# Patient Record
Sex: MALE | Race: BLACK OR AFRICAN AMERICAN | URBAN - METROPOLITAN AREA
[De-identification: names, ages, dates, MRNs, and addresses within clinical notes are randomized per-mention and may not be internally consistent; named-entity substitution may affect disease eponyms.]

---

## 2021-04-20 ENCOUNTER — TELEPHONE (OUTPATIENT)
Dept: INTERNAL MEDICINE CLINIC | Age: 51
End: 2021-04-20

## 2021-05-05 ENCOUNTER — TELEPHONE (OUTPATIENT)
Dept: INTERNAL MEDICINE CLINIC | Age: 51
End: 2021-05-05

## 2021-05-05 ENCOUNTER — OFFICE VISIT (OUTPATIENT)
Dept: INTERNAL MEDICINE CLINIC | Age: 51
End: 2021-05-05
Payer: COMMERCIAL

## 2021-05-05 VITALS
DIASTOLIC BLOOD PRESSURE: 92 MMHG | WEIGHT: 225 LBS | HEIGHT: 67 IN | TEMPERATURE: 98.9 F | SYSTOLIC BLOOD PRESSURE: 138 MMHG | BODY MASS INDEX: 35.31 KG/M2 | HEART RATE: 100 BPM | RESPIRATION RATE: 16 BRPM

## 2021-05-05 DIAGNOSIS — E78.5 HYPERLIPIDEMIA, UNSPECIFIED HYPERLIPIDEMIA TYPE: ICD-10-CM

## 2021-05-05 DIAGNOSIS — I10 ESSENTIAL HYPERTENSION: Primary | ICD-10-CM

## 2021-05-05 DIAGNOSIS — K59.00 CONSTIPATION, UNSPECIFIED CONSTIPATION TYPE: ICD-10-CM

## 2021-05-05 DIAGNOSIS — F17.200 TOBACCO USE DISORDER: ICD-10-CM

## 2021-05-05 DIAGNOSIS — E11.8 CONTROLLED TYPE 2 DIABETES MELLITUS WITH COMPLICATION, WITHOUT LONG-TERM CURRENT USE OF INSULIN (HCC): ICD-10-CM

## 2021-05-05 DIAGNOSIS — Z12.11 SCREENING FOR COLON CANCER: ICD-10-CM

## 2021-05-05 DIAGNOSIS — Z11.59 SCREENING FOR VIRAL DISEASE: ICD-10-CM

## 2021-05-05 PROCEDURE — 99204 OFFICE O/P NEW MOD 45 MIN: CPT | Performed by: INTERNAL MEDICINE

## 2021-05-05 RX ORDER — DOCUSATE SODIUM 100 MG/1
100 CAPSULE, LIQUID FILLED ORAL 2 TIMES DAILY PRN
COMMUNITY
Start: 2021-05-05 | End: 2021-05-05 | Stop reason: SDUPTHER

## 2021-05-05 RX ORDER — DOCUSATE SODIUM 100 MG/1
100 CAPSULE, LIQUID FILLED ORAL 2 TIMES DAILY PRN
Qty: 60 CAPSULE | Refills: 2 | Status: SHIPPED | OUTPATIENT
Start: 2021-05-05 | End: 2022-09-14

## 2021-05-05 RX ORDER — LISINOPRIL AND HYDROCHLOROTHIAZIDE 12.5; 1 MG/1; MG/1
1 TABLET ORAL DAILY
Qty: 30 TABLET | Refills: 3 | Status: SHIPPED | OUTPATIENT
Start: 2021-05-05 | End: 2021-05-06 | Stop reason: SDUPTHER

## 2021-05-05 RX ORDER — CHLORTHALIDONE 25 MG/1
25 TABLET ORAL EVERY MORNING
Qty: 30 TABLET | Refills: 3 | Status: SHIPPED | OUTPATIENT
Start: 2021-05-05 | End: 2021-05-05

## 2021-05-05 SDOH — ECONOMIC STABILITY: TRANSPORTATION INSECURITY
IN THE PAST 12 MONTHS, HAS LACK OF TRANSPORTATION KEPT YOU FROM MEETINGS, WORK, OR FROM GETTING THINGS NEEDED FOR DAILY LIVING?: NOT ASKED

## 2021-05-05 SDOH — ECONOMIC STABILITY: FOOD INSECURITY: WITHIN THE PAST 12 MONTHS, YOU WORRIED THAT YOUR FOOD WOULD RUN OUT BEFORE YOU GOT MONEY TO BUY MORE.: NOT ASKED

## 2021-05-05 SDOH — ECONOMIC STABILITY: FOOD INSECURITY: WITHIN THE PAST 12 MONTHS, THE FOOD YOU BOUGHT JUST DIDN'T LAST AND YOU DIDN'T HAVE MONEY TO GET MORE.: NOT ASKED

## 2021-05-05 SDOH — HEALTH STABILITY: MENTAL HEALTH: HOW MANY STANDARD DRINKS CONTAINING ALCOHOL DO YOU HAVE ON A TYPICAL DAY?: 1 OR 2

## 2021-05-05 ASSESSMENT — ENCOUNTER SYMPTOMS
SHORTNESS OF BREATH: 0
CONSTIPATION: 1
ABDOMINAL PAIN: 0
DIARRHEA: 0
COUGH: 0
RHINORRHEA: 0
TROUBLE SWALLOWING: 0
SORE THROAT: 0
NAUSEA: 0

## 2021-05-05 ASSESSMENT — PATIENT HEALTH QUESTIONNAIRE - PHQ9
SUM OF ALL RESPONSES TO PHQ QUESTIONS 1-9: 0
SUM OF ALL RESPONSES TO PHQ QUESTIONS 1-9: 0
2. FEELING DOWN, DEPRESSED OR HOPELESS: 0

## 2021-05-05 NOTE — TELEPHONE ENCOUNTER
Patient wife called in stating the patient did not have his Form filled out at his appointment and wants to know why not. I spoke with Court Poster who states they did not get the form until the patient was checking out from his appointment. Patient wife Suzan Boswell would like a call back from the doctor only to discuss.    Please call her or Angel @ [phone]  Thank you

## 2021-05-05 NOTE — PROGRESS NOTES
Angel Doe (:  1970) is a 54 y.o. male, here for evaluation of the following chief complaint(s):    Established New Doctor (Constipation. Undecided about Covid vaccine.)      ASSESSMENT/PLAN:  1. Essential hypertension  Blood pressure is elevated today and chart review shows it has been elevated in the past.  Advised patient to start lisinopril-hydrochlorothiazide and follow-up in 1 month. Warned him of potential side effects.  -     Lipid Panel; Future  -     Comprehensive Metabolic Panel, Fasting; Future  2. Hyperlipidemia, unspecified hyperlipidemia type  Chart review shows elevated lipids. Will recheck and likely start statin. 3. Controlled type 2 diabetes mellitus with complication, without long-term current use of insulin ([facility] [address]  Chart review shows an A1c of 6.6. Will recheck and likely start medication.  -     Hemoglobin A1C; Future  4. Screening for viral disease  -     HEPATITIS C ANTIBODY; Future  -     HIV-1 AND HIV-2 ANTIBODIES; Future  5. Screening for colon cancer  -     [facility] - Ramirez Alcala MD, Gastroenterology, [place]  6. Constipation, unspecified constipation type  Discussed he could use daily Colace and increase fluids  7. Tobacco use disorder  Counseled to quit  8. HM -advised patient to get Covid vaccine. He states he will be needing paperwork filled out for a foster care application. Told him that he may need to get Tdap for that. Return in about 4 weeks (around 2021). SUBJECTIVE/OBJECTIVE:  HPI   Patient is here to get established. Reviewed chart for past medical history as patient is a fair historian. He states that he has a foster care application that will need to be completed. States he does not have it with him today. Overall he feels well except for constipation. This is becoming more persistent problem. Denies blood in stool. He denies abdominal pain. Review of Systems   Constitutional: Negative for fatigue and fever.    HENT: Negative for rhinorrhea, sore throat and trouble swallowing. Eyes: Negative for visual disturbance. Respiratory: Negative for cough and shortness of breath. Cardiovascular: Negative for chest pain and palpitations. Gastrointestinal: Positive for constipation. Negative for abdominal pain, diarrhea and nausea. Genitourinary: Negative for decreased urine volume, dysuria and frequency. Musculoskeletal: Negative for arthralgias and myalgias. Skin: Negative for rash. Allergic/Immunologic: Negative for immunocompromised state. Neurological: Negative for dizziness, numbness and headaches. Hematological: Does not bruise/bleed easily. Psychiatric/Behavioral: Negative for dysphoric mood and sleep disturbance. The patient is not nervous/anxious. Past Medical History:   Diagnosis Date    Closed fracture of right tibia and fibula     as child    Diabetes mellitus type 2, uncomplicated ([address]     Hypertension     Other hyperlipidemia     Tobacco use disorder        Current Outpatient Medications   Medication Sig Dispense Refill    lisinopril-hydroCHLOROthiazide (PRINZIDE;ZESTORETIC) 10-12.5 MG per tablet Take 1 tablet by mouth daily 30 tablet 3    docusate sodium (COLACE) 100 MG capsule Take 1 capsule by mouth 2 times daily as needed for Constipation 60 capsule 2     No current facility-administered medications for this visit. Physical Exam  Vitals signs and nursing note reviewed. Constitutional:       General: He is not in acute distress. Appearance: He is well-developed. HENT:      Head: Normocephalic and atraumatic. Right Ear: External ear normal.      Left Ear: External ear normal.   Eyes:      General: No scleral icterus. Extraocular Movements: Extraocular movements intact. Neck:      Musculoskeletal: Normal range of motion. Thyroid: No thyromegaly. Cardiovascular:      Rate and Rhythm: Normal rate and regular rhythm. Heart sounds: No murmur. Pulmonary:      Effort: No respiratory distress. Breath sounds: Normal breath sounds. No wheezing or rales. Abdominal:      General: Bowel sounds are normal. There is no distension. Palpations: Abdomen is soft. Tenderness: There is no abdominal tenderness. Musculoskeletal: Normal range of motion. General: No deformity. Lymphadenopathy:      Cervical: No cervical adenopathy. Skin:     General: Skin is warm and dry. Neurological:      Mental Status: He is alert and oriented to person, place, and time. Cranial Nerves: No cranial nerve deficit. Sensory: No sensory deficit. Coordination: Coordination normal.   Psychiatric:         Thought Content: Thought content normal.         On this date I have spent 45 minutes reviewing previous notes, test results and face to face with the patient discussing the diagnosis and importance of compliance with the treatment plan as well as documenting on the day of the visit. This note was generated completely or in part utilizing Dragon dictation speech recognition software. Occasionally, words are mistranscribed and despite editing, the text may contain inaccuracies due to incorrect word recognition. If further clarification is needed please contact the office at [phone]          An electronic signature was used to authenticate this note.     --Phill Gale MD

## 2021-05-05 NOTE — TELEPHONE ENCOUNTER
Spoke to patient's wife. Told her he did not have the form to provide to me during the appointment. Told her that once I received it, I completed the form. He can come and pick it up but he needs to sign second page which is a release form. He needs to attach a copy of his immunization records as noted on the first page. He can either get a Tdap shot here or at a local pharmacy. Set up a nurse visit if he wants to get Tdap done here.

## 2021-05-06 ENCOUNTER — NURSE ONLY (OUTPATIENT)
Dept: INTERNAL MEDICINE CLINIC | Age: 51
End: 2021-05-06
Payer: COMMERCIAL

## 2021-05-06 ENCOUNTER — TELEPHONE (OUTPATIENT)
Dept: INTERNAL MEDICINE CLINIC | Age: 51
End: 2021-05-06

## 2021-05-06 DIAGNOSIS — Z23 NEED FOR TDAP VACCINATION: Primary | ICD-10-CM

## 2021-05-06 PROCEDURE — 90471 IMMUNIZATION ADMIN: CPT | Performed by: INTERNAL MEDICINE

## 2021-05-06 PROCEDURE — 90715 TDAP VACCINE 7 YRS/> IM: CPT | Performed by: INTERNAL MEDICINE

## 2021-05-06 RX ORDER — LISINOPRIL AND HYDROCHLOROTHIAZIDE 12.5; 1 MG/1; MG/1
1 TABLET ORAL DAILY
Qty: 30 TABLET | Refills: 3 | Status: SHIPPED | OUTPATIENT
Start: 2021-05-06 | End: 2021-09-22

## 2021-05-06 NOTE — TELEPHONE ENCOUNTER
Patient is requesting yesterday's medications to be resent to [facility] [address], [address]

## 2021-05-17 ENCOUNTER — TELEPHONE (OUTPATIENT)
Dept: INTERNAL MEDICINE CLINIC | Age: 51
End: 2021-05-17

## 2021-05-17 NOTE — TELEPHONE ENCOUNTER
----- Message from Wendie Wilkinson sent at 5/17/2021 12:35 PM EDT -----  Subject: Message to Provider    QUESTIONS  Information for Provider? PT would like nurse to call back  ---------------------------------------------------------------------------  --------------  [address]  What is the best way for the office to contact you? OK to leave message on   voicemail  Preferred Call Back Phone Number? [phone]  ---------------------------------------------------------------------------  --------------  SCRIPT ANSWERS  Relationship to Patient?  Self

## 2021-05-17 NOTE — TELEPHONE ENCOUNTER
Patient wanted to redo DM test because he was not fasting. Explained that the A1C is a 3 month average and does not need to be fasting. Patient said he was waiting to start his Metformin until it redone. Again explained the test that was done does not need to be fasting. Advised he is start the medication and follow up 2 weeks after that.  They will go pick medication up today

## 2021-09-15 LAB — LEFT VENTRICULAR EJECTION FRACTION, EXTERNAL: 43

## 2021-09-21 ENCOUNTER — TELEPHONE (OUTPATIENT)
Dept: INTERNAL MEDICINE CLINIC | Age: 51
End: 2021-09-21

## 2021-09-22 ENCOUNTER — OFFICE VISIT (OUTPATIENT)
Dept: INTERNAL MEDICINE CLINIC | Age: 51
End: 2021-09-22
Payer: COMMERCIAL

## 2021-09-22 VITALS
TEMPERATURE: 98 F | SYSTOLIC BLOOD PRESSURE: 136 MMHG | DIASTOLIC BLOOD PRESSURE: 90 MMHG | HEART RATE: 80 BPM | OXYGEN SATURATION: 96 % | WEIGHT: 213 LBS | RESPIRATION RATE: 16 BRPM | BODY MASS INDEX: 33.36 KG/M2

## 2021-09-22 DIAGNOSIS — F17.200 TOBACCO USE DISORDER: ICD-10-CM

## 2021-09-22 DIAGNOSIS — Z12.11 SCREENING FOR COLON CANCER: ICD-10-CM

## 2021-09-22 DIAGNOSIS — I63.9 CEREBROVASCULAR ACCIDENT (CVA), UNSPECIFIED MECHANISM (HCC): Primary | ICD-10-CM

## 2021-09-22 DIAGNOSIS — I50.20 SYSTOLIC CONGESTIVE HEART FAILURE, UNSPECIFIED HF CHRONICITY (HCC): ICD-10-CM

## 2021-09-22 DIAGNOSIS — E78.5 HYPERLIPIDEMIA, UNSPECIFIED HYPERLIPIDEMIA TYPE: ICD-10-CM

## 2021-09-22 DIAGNOSIS — I10 ESSENTIAL HYPERTENSION: ICD-10-CM

## 2021-09-22 LAB
CREATININE URINE: 225.3 MG/DL (ref 39–259)
HBA1C MFR BLD: 10.4 %
MICROALBUMIN UR-MCNC: 4.5 MG/DL
MICROALBUMIN/CREAT UR-RTO: 20 MG/G (ref 0–30)

## 2021-09-22 PROCEDURE — 83036 HEMOGLOBIN GLYCOSYLATED A1C: CPT | Performed by: INTERNAL MEDICINE

## 2021-09-22 PROCEDURE — 90472 IMMUNIZATION ADMIN EACH ADD: CPT | Performed by: INTERNAL MEDICINE

## 2021-09-22 PROCEDURE — 99215 OFFICE O/P EST HI 40 MIN: CPT | Performed by: INTERNAL MEDICINE

## 2021-09-22 PROCEDURE — 90732 PPSV23 VACC 2 YRS+ SUBQ/IM: CPT | Performed by: INTERNAL MEDICINE

## 2021-09-22 PROCEDURE — 90471 IMMUNIZATION ADMIN: CPT | Performed by: INTERNAL MEDICINE

## 2021-09-22 PROCEDURE — 90674 CCIIV4 VAC NO PRSV 0.5 ML IM: CPT | Performed by: INTERNAL MEDICINE

## 2021-09-22 RX ORDER — ATORVASTATIN CALCIUM 80 MG/1
80 TABLET, FILM COATED ORAL DAILY
Qty: 90 TABLET | Refills: 1 | Status: SHIPPED | OUTPATIENT
Start: 2021-09-22 | End: 2022-08-22 | Stop reason: SDUPTHER

## 2021-09-22 RX ORDER — CLOPIDOGREL BISULFATE 75 MG/1
75 TABLET ORAL DAILY
Qty: 30 TABLET | Refills: 0 | Status: SHIPPED | OUTPATIENT
Start: 2021-09-22 | End: 2021-11-19

## 2021-09-22 RX ORDER — ASPIRIN 81 MG/1
81 TABLET ORAL DAILY
Qty: 30 TABLET | Refills: 3 | COMMUNITY
Start: 2021-09-22

## 2021-09-22 RX ORDER — LISINOPRIL AND HYDROCHLOROTHIAZIDE 20; 12.5 MG/1; MG/1
1 TABLET ORAL DAILY
Qty: 90 TABLET | Refills: 3 | Status: SHIPPED | OUTPATIENT
Start: 2021-09-22 | End: 2021-11-01 | Stop reason: ALTCHOICE

## 2021-09-22 RX ORDER — METFORMIN HYDROCHLORIDE 500 MG/1
2000 TABLET, EXTENDED RELEASE ORAL
Qty: 120 TABLET | Refills: 1 | Status: SHIPPED | OUTPATIENT
Start: 2021-09-22 | End: 2021-11-19

## 2021-09-22 NOTE — LETTER
[facility]) Physicians [address] [address]  Phone: [phone]  Fax: [phone]    Jolene Tenorio MD        September 22, 2021     Patient: Ramila Bridges   YOB: 1970   Date of Visit: 9/22/2021       To Whom It May Concern: It is my medical opinion that Daly Cates should remain out of work until October 11. If you have any questions or concerns, please don't hesitate to call.     Sincerely,        Jolene Tenorio MD

## 2021-09-22 NOTE — PROGRESS NOTES
Angel Doe (:  1970) is a 54 y.o. male, here for evaluation of the following chief complaint(s): Other (Recent  stroke one week ago. Evaluated at [facility].)      ASSESSMENT/PLAN:  1. Cerebrovascular accident (CVA), unspecified mechanism [facility])  Reviewed his [facility] records with him. According to his wife he was discharged early from the hospital because he needed to attend the  of his brother and so certain things were not arranged for his follow-up care and we took care of these things today. Sent the medications to his pharmacy that he has not yet started. Arranged physical therapy. I believe patient has follow-up with [facility] neurology as per discharge summary. Reviewed brain imaging.  -     Gigi Pena MD, Cardiology, [facility]  -     aspirin EC 81 MG EC tablet; Take 1 tablet by mouth daily, Disp-30 tablet, R-3OTC  -     clopidogrel (PLAVIX) 75 MG tablet; Take 1 tablet by mouth daily, Disp-30 tablet, R-0Normal  -     [facility] - [place]  2. Systolic congestive heart failure, unspecified HF chronicity (HCC)  Echocardiogram with reduced ejection fraction. Patient was also supposed to have heart monitor arranged but states it was not done. Refer to cardiology. -     Gigi Pena MD, Cardiology, [facility]  3. Diabetes mellitus type 2 with complications, uncontrolled ([address]  Poorly controlled. Patient states he has loose stools with his current Metformin. Advised him to change to extended release and will increase the dose. May add another medication in 2 weeks. -     Microalbumin / Creatinine Urine Ratio  -     POCT glycosylated hemoglobin (Hb A1C)  -     metFORMIN (GLUCOPHAGE-XR) 500 MG extended release tablet; Take 4 tablets by mouth daily (with breakfast), Disp-120 tablet, R-1Normal  4. Essential hypertension  Mildly elevated.   Increase dose of blood pressure medication as follows. Call if lip swelling recurs. -     lisinopril-hydroCHLOROthiazide (PRINZIDE;ZESTORETIC) 20-12.5 MG per tablet; Take 1 tablet by mouth daily, Disp-90 tablet, R-3Normal  5. Hyperlipidemia, unspecified hyperlipidemia type  Sent his statin to the pharmacy. He has not yet started it  -     atorvastatin (LIPITOR) 80 MG tablet; Take 1 tablet by mouth daily, Disp-90 tablet, R-1Normal  6. Screening for colon cancer  -     [facility] - Selena Varghese MD, Gastroenterology, [place]  7. Tobacco use disorder  He has cut back on his own. -     nicotine (NICODERM CQ) 7 MG/24HR; Place 1 patch onto the skin daily for 14 days, Disp-14 patch, R-0Normal      Return in about 2 weeks (around 10/6/2021). SUBJECTIVE/OBJECTIVE:  HPI   Patient is here to follow-up after stroke. He was seen at [facility] earlier this week. According to his wife, who was in the car with young children, but on video with us, he was discharged early so many things were not arranged. His brother had passed away and the patient needed to go to the .    The patient would like to return to work. He does physical labor. He continues to have weakness on the left arm. He has not had physical therapy arranged. Brain MRI  1.  No evidence of flow-limiting stenosis. Head   1.  No large vessel occlusion.    2.  Moderate focal narrowing in the right posterior M2 origin. Additional multifocal areas of irregularity and narrowing in the basilar, posterior cerebral and middle cerebral arteries, likely representing diffuse atherosclerotic disease.    - Left ventricle: The cavity size is normal. Wall thickness is normal. Systolic function was mildly     to moderately reduced. The estimated ejection fraction was in the range of 40% to 45%. Mild     diffuse hypokinesis with regional variations. Doppler parameters are consistent with abnormal left     ventricular relaxation (grade 1 diastolic dysfunction).    - Aortic valve: Mild regurgitation.   - Right ventricle: Systolic function was normal.   - Atrial septum: Agitated saline contrast study at baseline or with provocation, shows no     right-to-left atrial level shunt. Metformin makes him have loose stools. Patient reports many people in her family have angioedema from lisinopril and so she would prefer he take a different medication. We discussed the echocardiogram with the low ejection fraction and the importance of taking an ACE or ARB. Patient has not been having lip swelling except he says for one episode. His residual deficits from the stroke include fatigue and and left arm weakness? ?.  He denies facial numbness. He denies trouble swallowing. He has an old problem with the right eye deviating out. He states left-sided facial droop has resolved. I did not evaluate his face because he has not had a Covid vaccine.         Past Medical History:   Diagnosis Date    Closed fracture of right tibia and fibula     as child    Diabetes mellitus type 2, uncomplicated ([facility] [address]     Hypertension     Other hyperlipidemia     Stroke ([facility] 09/2021        Tobacco use disorder        Current Outpatient Medications   Medication Sig Dispense Refill    metFORMIN (GLUCOPHAGE-XR) 500 MG extended release tablet Take 4 tablets by mouth daily (with breakfast) 120 tablet 1    aspirin EC 81 MG EC tablet Take 1 tablet by mouth daily 30 tablet 3    clopidogrel (PLAVIX) 75 MG tablet Take 1 tablet by mouth daily 30 tablet 0    atorvastatin (LIPITOR) 80 MG tablet Take 1 tablet by mouth daily 90 tablet 1    lisinopril-hydroCHLOROthiazide (PRINZIDE;ZESTORETIC) 20-12.5 MG per tablet Take 1 tablet by mouth daily 90 tablet 3    nicotine (NICODERM CQ) 7 MG/24HR Place 1 patch onto the skin daily for 14 days 14 patch 0    docusate sodium (COLACE) 100 MG capsule Take 1 capsule by mouth 2 times daily as needed for Constipation 60 capsule 2     No current facility-administered medications for this visit.       Physical Exam  Vitals and nursing note reviewed. Constitutional:       General: He is not in acute distress. Appearance: He is well-developed. HENT:      Head: Normocephalic and atraumatic. Right Ear: External ear normal.      Left Ear: External ear normal.      Nose: Nose normal.   Eyes:      General: No scleral icterus. Neck:      Thyroid: No thyromegaly. Cardiovascular:      Rate and Rhythm: Normal rate and regular rhythm. Heart sounds: No murmur heard. Pulmonary:      Effort: No respiratory distress. Breath sounds: Normal breath sounds. No wheezing or rales. Abdominal:      General: Bowel sounds are normal. There is no distension. Palpations: Abdomen is soft. Tenderness: There is no abdominal tenderness. Musculoskeletal:         General: No deformity. Normal range of motion. Cervical back: Normal range of motion. Right lower leg: No edema. Left lower leg: No edema. Lymphadenopathy:      Cervical: No cervical adenopathy. Skin:     General: Skin is warm and dry. Neurological:      Mental Status: He is alert and oriented to person, place, and time. Sensory: No sensory deficit. Motor: No weakness. Coordination: Coordination normal.      Comments: Cn's not checked   Psychiatric:         Thought Content: Thought content normal.           On this date 9/22/2021 I have spent 45 minutes reviewing previous notes, test results and face to face with the patient discussing the diagnosis and importance of compliance with the treatment plan as well as documenting on the day of the visit. This note was generated completely or in part utilizing Dragon dictation speech recognition software. Occasionally, words are mistranscribed and despite editing, the text may contain inaccuracies due to incorrect word recognition.   If further clarification is needed please contact the office at [phone]          An electronic signature was

## 2021-09-22 NOTE — PATIENT INSTRUCTIONS
Change from 2x/day metformin to XR Metformin-4/day  May add another drug next visit for diabetes    Increase lisinopril to 20/12.5    Urine test today    Call about retinal exam    Flu and pneumovax shot  covid vaccine at pharmacy    Gi/colon  [phone]    Sent meds to [facility]era   Physical therapy consult    Cardiology -discuss echo finding with low ejection fraction and also need for heart monitor

## 2021-10-05 ENCOUNTER — TELEPHONE (OUTPATIENT)
Dept: INTERNAL MEDICINE CLINIC | Age: 51
End: 2021-10-05

## 2021-10-05 NOTE — TELEPHONE ENCOUNTER
He is Aware. You can call to remind him. --  Forwarding this also to Simone Queen for message below.

## 2021-10-11 ENCOUNTER — TELEPHONE (OUTPATIENT)
Dept: INTERNAL MEDICINE CLINIC | Age: 51
End: 2021-10-11

## 2021-10-11 NOTE — TELEPHONE ENCOUNTER
Patient came for follow-up appointment but states he was exposed to Covid a few days ago and he is experiencing chills today. Video visit is not appropriate for the purpose of today's appt to recheck bp etc.    Told him we would set him up for a Covid test this Wednesday. Told him I would write a work note release 1 more week until we settle the Covid testing issue. Please assist in speaking to his job, letting him know his followup visit is now pushed out 2 more weeks. Told wife I would work on his [employer] disability form today/tomorrow.   --  Forwarding to Social Work and scheduling staff

## 2021-10-11 NOTE — LETTER
[facility]) Physicians [address]  [address] [place] [id number]  Phone: [phone]  Fax: [phone]    Hossein Feldman MD        October 11, 2021     Patient: Rafael Dave   YOB: 1970   Date of Visit: 10/11/2021       To Whom It May Concern: It is my medical opinion that Janicee Hazard should remain out of work until at least October 18, 2021 due to Covid exposure with symptoms. Will reassess after testing. .    If you have any questions or concerns, please don't hesitate to call.     Sincerely,        Hossein Feldman MD

## 2021-10-13 ENCOUNTER — NURSE ONLY (OUTPATIENT)
Dept: INTERNAL MEDICINE CLINIC | Age: 51
End: 2021-10-13

## 2021-10-13 ENCOUNTER — TELEPHONE (OUTPATIENT)
Dept: INTERNAL MEDICINE CLINIC | Age: 51
End: 2021-10-13

## 2021-10-13 DIAGNOSIS — Z11.59 ENCOUNTER FOR SCREENING FOR VIRAL DISEASE: ICD-10-CM

## 2021-10-13 NOTE — TELEPHONE ENCOUNTER
Patients wife is calling about the short term disability paperwork that Dr Josesito Brito was working on stating that this is supposed to be turned in by tomorrow 10-. She states she spoke with his employer and they need this in order for the patient to get paid for his time off.

## 2021-10-13 NOTE — TELEPHONE ENCOUNTER
He has not been tested yet, he has an appt today at 1pm to be covid tested.  He states cant make any appts until he is tested,

## 2021-10-13 NOTE — TELEPHONE ENCOUNTER
Tried to call pt, phone was breaking up bad.  Called back no answer so I left a vm asking that he calls the office back

## 2021-10-13 NOTE — TELEPHONE ENCOUNTER
Tell patient I referred him to PT and to Cardiology at our last visit and I don't see that he has appointments with them. He needs to make those appointments. Is his Covid test negative or positive? Let him know I have his paperwork done for his job.

## 2021-10-14 ENCOUNTER — TELEPHONE (OUTPATIENT)
Dept: PRIMARY CARE CLINIC | Age: 51
End: 2021-10-14

## 2021-10-14 LAB — SARS-COV-2: DETECTED

## 2021-10-21 ENCOUNTER — TELEPHONE (OUTPATIENT)
Dept: INTERNAL MEDICINE CLINIC | Age: 51
End: 2021-10-21

## 2021-10-21 DIAGNOSIS — Z11.59 SCREENING FOR VIRAL DISEASE: Primary | ICD-10-CM

## 2021-10-21 NOTE — TELEPHONE ENCOUNTER
Please place order for covid test. No symptoms at this time was advised to be retested by dr Josesito Brito

## 2021-10-25 ENCOUNTER — NURSE ONLY (OUTPATIENT)
Dept: INTERNAL MEDICINE CLINIC | Age: 51
End: 2021-10-25

## 2021-10-25 DIAGNOSIS — Z11.59 SCREENING FOR VIRAL DISEASE: ICD-10-CM

## 2021-10-26 LAB — SARS-COV-2, PCR: DETECTED

## 2021-11-01 ENCOUNTER — OFFICE VISIT (OUTPATIENT)
Dept: INTERNAL MEDICINE CLINIC | Age: 51
End: 2021-11-01
Payer: COMMERCIAL

## 2021-11-01 VITALS
DIASTOLIC BLOOD PRESSURE: 84 MMHG | WEIGHT: 213 LBS | SYSTOLIC BLOOD PRESSURE: 140 MMHG | OXYGEN SATURATION: 98 % | HEIGHT: 67 IN | HEART RATE: 87 BPM | TEMPERATURE: 98 F | BODY MASS INDEX: 33.43 KG/M2

## 2021-11-01 DIAGNOSIS — I63.9 CEREBROVASCULAR ACCIDENT (CVA), UNSPECIFIED MECHANISM (HCC): ICD-10-CM

## 2021-11-01 DIAGNOSIS — E78.5 HYPERLIPIDEMIA, UNSPECIFIED HYPERLIPIDEMIA TYPE: ICD-10-CM

## 2021-11-01 DIAGNOSIS — Z11.59 SCREENING FOR VIRAL DISEASE: ICD-10-CM

## 2021-11-01 DIAGNOSIS — I10 ESSENTIAL HYPERTENSION: ICD-10-CM

## 2021-11-01 DIAGNOSIS — U07.1 COVID-19: Primary | ICD-10-CM

## 2021-11-01 PROCEDURE — 99214 OFFICE O/P EST MOD 30 MIN: CPT | Performed by: INTERNAL MEDICINE

## 2021-11-01 RX ORDER — LISINOPRIL AND HYDROCHLOROTHIAZIDE 25; 20 MG/1; MG/1
1 TABLET ORAL DAILY
Qty: 90 TABLET | Refills: 1 | Status: SHIPPED | OUTPATIENT
Start: 2021-11-01 | End: 2022-04-29

## 2021-11-01 NOTE — PROGRESS NOTES
Angel Doe (:  1970) is a 54 y.o. male, here for evaluation of the following chief complaint(s): Other (CVA f/u )      ASSESSMENT/PLAN:  1. COVID-19  Other than residual fatigue and mild loss of taste, patient is recovering well from Covid. 2. Diabetes mellitus type 2 with complications, uncontrolled ([address]  Check labs on Metformin. Patient reports compliance. Loose stools are doing better on extended release. -     Hemoglobin A1C; Future  -     Comprehensive Metabolic Panel, Fasting; Future  -     Lipid Panel; Future  3. Cerebrovascular accident (CVA), unspecified mechanism ([address]  Patient's left upper arm has 5 out of 5 strength. He states there is residual stiffness. Advised physical therapy again. Also advised to have ED follow-up appointment at [facility] neurology to determine length of therapy with Plavix. Continue aspirin. He continues to work on quitting smoking and is wearing a 14 mg nicotine patch.  -     CBC; Future  4. Essential hypertension  Mildly elevated on current medications. Change to lisinopril HCTZ .  -     URIC ACID; Future  5. Hyperlipidemia, unspecified hyperlipidemia type  Patient states he had pruritus on first days of taking Lipitor but it is controlled. Check labs. 6. Screening for viral disease  -     Hepatitis B Surface Antibody; Future  7. Systolic CHF -again advised to see Dr. Jessica Storey  Echocardiogram with reduced ejection fraction. Patient was also supposed to have heart monitor arranged but states it was not done at [facility].      8. Tobacco use disorder  He is trying to quit    Return in about 3 months (around 2022). SUBJECTIVE/OBJECTIVE:  HPI   Patient is here to follow-up. He has had a hard time seeing specialists regarding his stroke in September, for which he was seen at [facility]. This is due to himself and family members having Covid in recent weeks and being in quarantine.   I spoke to his wife and she plans to make sure he gets to visits in the next 2 weeks. His strength is improving but he does still have some residual left arm stiffness. He has not yet seen physical therapy. He reports compliance with medication. His wife prepares a pillbox for him. He has no new complaints except for residual fatigue and mild loss of taste from Covid. He denies chest pain or shortness of breath. He briefly felt itchy when he started the statin but this has resolved. Past Medical History:   Diagnosis Date    Closed fracture of right tibia and fibula     as child    COVID-19     [facility] mellitus type 2, uncomplicated ([facility]     Hypertension     Other hyperlipidemia     Stroke ([facility] 33/8812    uc    Systolic CHF ([facility]     echo 9/21    Tobacco use disorder        Current Outpatient Medications   Medication Sig Dispense Refill    lisinopril-hydroCHLOROthiazide (PRINZIDE;ZESTORETIC) 20-25 MG per tablet Take 1 tablet by mouth daily 90 tablet 1    metFORMIN (GLUCOPHAGE-XR) 500 MG extended release tablet Take 4 tablets by mouth daily (with breakfast) 120 tablet 1    aspirin EC 81 MG EC tablet Take 1 tablet by mouth daily 30 tablet 3    clopidogrel (PLAVIX) 75 MG tablet Take 1 tablet by mouth daily 30 tablet 0    atorvastatin (LIPITOR) 80 MG tablet Take 1 tablet by mouth daily 90 tablet 1    nicotine (NICODERM CQ) 7 MG/24HR Place 1 patch onto the skin daily for 14 days 14 patch 0    docusate sodium (COLACE) 100 MG capsule Take 1 capsule by mouth 2 times daily as needed for Constipation 60 capsule 2     No current facility-administered medications for this visit. Physical Exam  Vitals and nursing note reviewed. Constitutional:       General: He is not in acute distress. Appearance: He is well-developed. HENT:      Head: Normocephalic and atraumatic. Right Ear: External ear normal.      Left Ear: External ear normal.   Eyes:      General: No scleral icterus.      Extraocular Movements:

## 2021-11-01 NOTE — LETTER
[facility]) Physicians [address] [address]  Phone: [phone]  Fax: [phone]    Paige Do MD        November 1, 2021     Patient: Chacorta Velasquez   YOB: 1970   Date of Visit: 11/1/2021       To Whom It May Concern: It is my medical opinion that Freddy Kelley should remain off work for 2 more weeks. He may return on 11/15/21. .    If you have any questions or concerns, please don't hesitate to call.     Sincerely,        Paige Do MD

## 2021-11-01 NOTE — PATIENT INSTRUCTIONS
[facility] Neurology - how long on Plavix    Cardiology- Dr. Olimpia Ramirez- to review Echo and discuss possible CHF and heart monitor  [address], [address] [place] [facility] [place], [place]   [address] , [address] [facility], [address] [place] [address]   [phone]    Labs  Low salt diet    Physical therapist if left arm weakness  [facility]   [facility], [address] [facility] [address]   Telephone: [phone]   Fax: [phone]

## 2021-11-02 RX ORDER — GLIMEPIRIDE 2 MG/1
2 TABLET ORAL
Qty: 30 TABLET | Refills: 5 | Status: SHIPPED | OUTPATIENT
Start: 2021-11-02 | End: 2022-08-22 | Stop reason: SDUPTHER

## 2021-11-05 ENCOUNTER — TELEPHONE (OUTPATIENT)
Dept: INTERNAL MEDICINE CLINIC | Age: 51
End: 2021-11-05

## 2021-11-05 ENCOUNTER — OFFICE VISIT (OUTPATIENT)
Dept: INTERNAL MEDICINE CLINIC | Age: 51
End: 2021-11-05

## 2021-11-05 ENCOUNTER — NURSE ONLY (OUTPATIENT)
Dept: INTERNAL MEDICINE CLINIC | Age: 51
End: 2021-11-05
Payer: COMMERCIAL

## 2021-11-05 DIAGNOSIS — I10 ESSENTIAL HYPERTENSION: ICD-10-CM

## 2021-11-05 PROCEDURE — 90746 HEPB VACCINE 3 DOSE ADULT IM: CPT | Performed by: INTERNAL MEDICINE

## 2021-11-05 PROCEDURE — 99999 PR OFFICE/OUTPT VISIT,PROCEDURE ONLY: CPT | Performed by: DIETITIAN, REGISTERED

## 2021-11-05 PROCEDURE — 90471 IMMUNIZATION ADMIN: CPT | Performed by: INTERNAL MEDICINE

## 2021-11-05 NOTE — PROGRESS NOTES
Medical Nutrition Therapy for Diabetes    Mely Doe  November 5, 2021      Patient Care Team:  Blayne Calixto MD as PCP - General (Internal Medicine)  Blayne Calixto MD as PCP - [facility]  rJ Kaumfan MD as Consulting Physician (Emergency Medicine)    Reason for visit: New Patient - Diabetes, Hypertension, weight concerns    ASSESSMENT/PLAN:   NUTRITION DIAGNOSIS    #1 Problem: Altered Nutrition-Related Laboratory Values (NC-2.2)  Related to: Endocrine/Diabetes   As Evidenced by: Elevated Plasma glucose and/or HgbA1c levels           #2 Problem: Impaired nutrient utilization--need to restrict sodium intake  Related to: hypertension  As Evidenced by: blood pressure readings      #3 Problem: Overweight/Obesity (NC-3.3)  Related to: Excessive energy intake or physical inactivity  As Evidenced by: BMI more than normative standard for age and sex (BMI=3.36)      NUTRITION INTERVENTION  Nutrition Prescription: 30 - 45 g Carbohydrate per meal, 15 - 20 g per snack      Diabetes Education/Counseling included:  Carbohydrate Control, Activity/exercise, Label-reading and Hypoyglycemia prevention/treatment    Interventions:  Control Carbohydrate Intake using Plate Guide, Increase intake of vegetables and Increase activity level by walking    NUTRITION MONITORING AND EVALUATION    Patient Instructions   BEHAVIOR GOALS     When to eat: Eat first meal within two hour of waking, then have meal or snack every five hours while awake. What and how much to eat:   1) Plan meals to follow Plate Guide, with 1/2 plate vegetables, 1/4 plate protein, and 1/4 plate starch or fruit  2) Aim for 30 - 45 g Carb per meal; 15 - 20 g Carb per snack  3) Eliminate sugary drinks and juices. Choose no Calorie options    Physical Activity: Add short bouts of exercise every day. Other: be prepared to treat low blood sugar by carrying glucose tablets. Medications: While off work:    Take Glimepiride (amaryl) with breakfast and then make sure you eat lunch within 5 hours. Take Metformin at breakfast    When back to work: Take Glimepiride (amaryl) with your dinner and then be sure to eat something with carbs (small piece of fruit or a granola bar) within 5 hours. Take Metformin with dinner. Patient Active Problem List   Diagnosis    Tobacco use disorder    Other hyperlipidemia    Hypertension    Diabetes mellitus type 2, uncomplicated (HCC)       Current Outpatient Medications   Medication Sig Dispense Refill    glimepiride (AMARYL) 2 MG tablet Take 1 tablet by mouth every morning (before breakfast) 30 tablet 5    lisinopril-hydroCHLOROthiazide (PRINZIDE;ZESTORETIC) 20-25 MG per tablet Take 1 tablet by mouth daily 90 tablet 1    metFORMIN (GLUCOPHAGE-XR) 500 MG extended release tablet Take 4 tablets by mouth daily (with breakfast) 120 tablet 1    aspirin EC 81 MG EC tablet Take 1 tablet by mouth daily 30 tablet 3    clopidogrel (PLAVIX) 75 MG tablet Take 1 tablet by mouth daily 30 tablet 0    atorvastatin (LIPITOR) 80 MG tablet Take 1 tablet by mouth daily 90 tablet 1    nicotine (NICODERM CQ) 7 MG/24HR Place 1 patch onto the skin daily for 14 days 14 patch 0    docusate sodium (COLACE) 100 MG capsule Take 1 capsule by mouth 2 times daily as needed for Constipation 60 capsule 2     No current facility-administered medications for this visit.          NUTRITION ASSESSMENT    Biochemical Data:    Lab Results   Component Value Date    LABA1C 8.9 11/01/2021     Lab Results   Component Value Date    .7 11/01/2021       Lab Results   Component Value Date    CHOL 157 11/01/2021    CHOL 304 (H) 05/05/2021     Lab Results   Component Value Date    TRIG 87 11/01/2021    TRIG 311 (H) 05/05/2021     Lab Results   Component Value Date    HDL 34 (L) 11/01/2021    HDL 33 (L) 05/05/2021     Lab Results   Component Value Date    LDLCALC 106 (H) 11/01/2021    LDLCALC see below 05/05/2021 gave up juice a few months ago  Alcohol consumption: 1 day per week -has 1 - 2 beers  Usual Food consumption:   FOOD RECALL    First meal--Usual time: 5 - 6p  Today: chavez beans, pig's feet, blanka slaw, sushi  Different Day: fish (steamed), vegetables      Snack    Second meal--Usual time: 1:30a  Recent: at work - apple or orange or grapes  Different Day: bowl of noodles or other leftovers    Snack  4a - at work - fruit or granola bar        Motivational Interviewing       Barriers:   -low health literacy          Follow Up Plan: 5 weeks    Referring Provider: Adam Perez MD  Time spent with patient: 50 minutes

## 2021-11-05 NOTE — TELEPHONE ENCOUNTER
Patient has appointment with Dr. Matthew Jeong on 12/23/2021 at [facility]. Just an FYI.

## 2021-11-19 DIAGNOSIS — I63.9 CEREBROVASCULAR ACCIDENT (CVA), UNSPECIFIED MECHANISM (HCC): ICD-10-CM

## 2021-11-19 RX ORDER — CLOPIDOGREL BISULFATE 75 MG/1
TABLET ORAL
Qty: 30 TABLET | Refills: 0 | Status: SHIPPED | OUTPATIENT
Start: 2021-11-19 | End: 2021-12-24

## 2021-11-19 RX ORDER — METFORMIN HYDROCHLORIDE 500 MG/1
TABLET, EXTENDED RELEASE ORAL
Qty: 120 TABLET | Refills: 1 | Status: SHIPPED | OUTPATIENT
Start: 2021-11-19 | End: 2022-02-01

## 2021-11-19 NOTE — TELEPHONE ENCOUNTER
Last appointment: 11/1/2021  Next appointment: 2/2/2022  Last refill: 9/22/2021; 120 +1;30    [address] [facility]

## 2022-02-01 RX ORDER — METFORMIN HYDROCHLORIDE 500 MG/1
TABLET, EXTENDED RELEASE ORAL
Qty: 120 TABLET | Refills: 1 | Status: SHIPPED | OUTPATIENT
Start: 2022-02-01 | End: 2022-04-04

## 2022-03-03 DIAGNOSIS — I63.9 CEREBROVASCULAR ACCIDENT (CVA), UNSPECIFIED MECHANISM (HCC): ICD-10-CM

## 2022-03-03 NOTE — TELEPHONE ENCOUNTER
Patient missed his last couple of appointments with me. Is he planning to follow-up? Please ask him if he saw [facility] neurology again? They were supposed to help determine length of therapy for the Plavix.

## 2022-03-04 RX ORDER — CLOPIDOGREL BISULFATE 75 MG/1
TABLET ORAL
Qty: 30 TABLET | Refills: 0 | OUTPATIENT
Start: 2022-03-04

## 2022-04-04 RX ORDER — METFORMIN HYDROCHLORIDE 500 MG/1
TABLET, EXTENDED RELEASE ORAL
Qty: 120 TABLET | Refills: 0 | Status: SHIPPED | OUTPATIENT
Start: 2022-04-04 | End: 2022-04-29

## 2022-04-04 NOTE — TELEPHONE ENCOUNTER
Last appointment: 11/1/2021  Next appointment: Visit date not found  Last refill: 2/1/22  Left message for patient to call and schedule due/overdue appointment.

## 2022-04-10 DIAGNOSIS — I63.9 CEREBROVASCULAR ACCIDENT (CVA), UNSPECIFIED MECHANISM (HCC): ICD-10-CM

## 2022-04-11 RX ORDER — CLOPIDOGREL BISULFATE 75 MG/1
TABLET ORAL
Qty: 30 TABLET | Refills: 0 | OUTPATIENT
Start: 2022-04-11

## 2022-04-28 RX ORDER — METFORMIN HYDROCHLORIDE 500 MG/1
TABLET, EXTENDED RELEASE ORAL
Qty: 120 TABLET | Refills: 0 | Status: CANCELLED | OUTPATIENT
Start: 2022-04-28

## 2022-04-28 NOTE — TELEPHONE ENCOUNTER
Last appointment: 11/1/2021  Next appointment: was due 2/1/2022  Last refill: metformin-4/4/2022                   Lisinopril/hydo -11/1/2021    Left message for patient to call and schedule due/overdue appointment.

## 2022-04-29 RX ORDER — METFORMIN HYDROCHLORIDE 500 MG/1
TABLET, EXTENDED RELEASE ORAL
Qty: 120 TABLET | Refills: 0 | Status: SHIPPED | OUTPATIENT
Start: 2022-04-29 | End: 2022-06-22

## 2022-04-29 RX ORDER — LISINOPRIL AND HYDROCHLOROTHIAZIDE 25; 20 MG/1; MG/1
TABLET ORAL
Qty: 90 TABLET | Refills: 0 | Status: SHIPPED | OUTPATIENT
Start: 2022-04-29 | End: 2022-08-22 | Stop reason: SDUPTHER

## 2022-06-22 RX ORDER — METFORMIN HYDROCHLORIDE 500 MG/1
TABLET, EXTENDED RELEASE ORAL
Qty: 120 TABLET | Refills: 0 | Status: SHIPPED | OUTPATIENT
Start: 2022-06-22 | End: 2022-08-22 | Stop reason: SDUPTHER

## 2022-08-02 RX ORDER — LISINOPRIL AND HYDROCHLOROTHIAZIDE 25; 20 MG/1; MG/1
TABLET ORAL
Qty: 30 TABLET | Refills: 0 | OUTPATIENT
Start: 2022-08-02

## 2022-08-02 RX ORDER — GLIMEPIRIDE 2 MG/1
TABLET ORAL
Qty: 30 TABLET | Refills: 0 | OUTPATIENT
Start: 2022-08-02

## 2022-08-02 RX ORDER — METFORMIN HYDROCHLORIDE 500 MG/1
TABLET, EXTENDED RELEASE ORAL
Qty: 120 TABLET | Refills: 0 | OUTPATIENT
Start: 2022-08-02

## 2022-08-02 NOTE — TELEPHONE ENCOUNTER
Last appointment: 11/1/2021  Next appointment: Visit date not found  Last refill: 11/2/21,4/29/22 and 6/22/22   We have reached out to pt to schedule multiple times with no response #30 pending also put in a note to pharmacy

## 2022-08-17 RX ORDER — GLIMEPIRIDE 2 MG/1
TABLET ORAL
Qty: 30 TABLET | Refills: 5 | OUTPATIENT
Start: 2022-08-17

## 2022-08-17 RX ORDER — METFORMIN HYDROCHLORIDE 500 MG/1
TABLET, EXTENDED RELEASE ORAL
Qty: 120 TABLET | Refills: 0 | OUTPATIENT
Start: 2022-08-17

## 2022-08-17 NOTE — TELEPHONE ENCOUNTER
We have reached out to patient multiple times with no response. We have cut down his refills and wrote a note to the pharmacy advising due for OV and still no response. Last appointment: 11/1/2021  Next appointment: Visit date not found       Do you want to continue filling or refuse?

## 2022-08-22 DIAGNOSIS — E78.5 HYPERLIPIDEMIA, UNSPECIFIED HYPERLIPIDEMIA TYPE: ICD-10-CM

## 2022-08-22 RX ORDER — METFORMIN HYDROCHLORIDE 500 MG/1
TABLET, EXTENDED RELEASE ORAL
Qty: 120 TABLET | Refills: 0 | Status: SHIPPED | OUTPATIENT
Start: 2022-08-22 | End: 2022-09-23

## 2022-08-22 RX ORDER — ATORVASTATIN CALCIUM 80 MG/1
80 TABLET, FILM COATED ORAL DAILY
Qty: 30 TABLET | Refills: 0 | Status: SHIPPED | OUTPATIENT
Start: 2022-08-22

## 2022-08-22 RX ORDER — LISINOPRIL AND HYDROCHLOROTHIAZIDE 25; 20 MG/1; MG/1
TABLET ORAL
Qty: 30 TABLET | Refills: 0 | Status: SHIPPED | OUTPATIENT
Start: 2022-08-22 | End: 2022-09-14

## 2022-08-22 RX ORDER — GLIMEPIRIDE 2 MG/1
2 TABLET ORAL
Qty: 30 TABLET | Refills: 0 | Status: SHIPPED | OUTPATIENT
Start: 2022-08-22

## 2022-08-22 NOTE — TELEPHONE ENCOUNTER
----- Message from [email or web address] sent at 8/22/2022 11:26 AM EDT -----  Subject: Refill Request    QUESTIONS  Name of Medication? metFORMIN (GLUCOPHAGE-XR) 500 MG extended release   tablet  Patient-reported dosage and instructions? 500 mg once daily   How many days do you have left? 0  Preferred Pharmacy? [facility] [id number]  Pharmacy phone number (if available)? [phone]  ---------------------------------------------------------------------------  --------------,  Name of Medication? lisinopril-hydroCHLOROthiazide (PRINZIDE;ZESTORETIC)   20-25 MG per tablet  Patient-reported dosage and instructions? 20 mg once daily   How many days do you have left? 2  Preferred Pharmacy? [facility] [id number]  Pharmacy phone number (if available)? [phone]  ---------------------------------------------------------------------------  --------------,  Name of Medication? atorvastatin (LIPITOR) 80 MG tablet  Patient-reported dosage and instructions? 80mg once daily   How many days do you have left? 10  Preferred Pharmacy? [facility] [id number]  Pharmacy phone number (if available)? [phone]  ---------------------------------------------------------------------------  --------------,  Name of Medication? glimepiride (AMARYL) 2 MG tablet  Patient-reported dosage and instructions? 2mg once daily   How many days do you have left? 0  Preferred Pharmacy? [facility] [id number]  Pharmacy phone number (if available)? [phone]  Additional Information for Provider? pt was told he needed to schedule a   f/u visit before he could get his medication. He does have a visit   scheduled, he is completely out of his metformin and needs to have that   refilled   ---------------------------------------------------------------------------  --------------  [address]  What is the best way for the office to contact you? OK to leave message on   voicemail  Preferred Call Back Phone Number? [phone]  ---------------------------------------------------------------------------  --------------  SCRIPT ANSWERS  Relationship to Patient?  Self

## 2022-08-22 NOTE — TELEPHONE ENCOUNTER
Last appointment: 11/1/2021  Next appointment: 9/14/2022  Last refill: 11/2/2021 4/29/2022 6/22/2022

## 2022-09-14 ENCOUNTER — OFFICE VISIT (OUTPATIENT)
Dept: INTERNAL MEDICINE CLINIC | Age: 52
End: 2022-09-14
Payer: COMMERCIAL

## 2022-09-14 VITALS
SYSTOLIC BLOOD PRESSURE: 142 MMHG | HEART RATE: 72 BPM | WEIGHT: 216.5 LBS | BODY MASS INDEX: 33.91 KG/M2 | OXYGEN SATURATION: 98 % | DIASTOLIC BLOOD PRESSURE: 80 MMHG

## 2022-09-14 DIAGNOSIS — Z12.5 SCREENING PSA (PROSTATE SPECIFIC ANTIGEN): ICD-10-CM

## 2022-09-14 DIAGNOSIS — I63.9 CEREBROVASCULAR ACCIDENT (CVA), UNSPECIFIED MECHANISM (HCC): ICD-10-CM

## 2022-09-14 DIAGNOSIS — F17.200 TOBACCO USE DISORDER: ICD-10-CM

## 2022-09-14 DIAGNOSIS — I10 ESSENTIAL HYPERTENSION: ICD-10-CM

## 2022-09-14 PROCEDURE — 99214 OFFICE O/P EST MOD 30 MIN: CPT | Performed by: INTERNAL MEDICINE

## 2022-09-14 PROCEDURE — 90674 CCIIV4 VAC NO PRSV 0.5 ML IM: CPT | Performed by: INTERNAL MEDICINE

## 2022-09-14 PROCEDURE — 90471 IMMUNIZATION ADMIN: CPT | Performed by: INTERNAL MEDICINE

## 2022-09-14 RX ORDER — LISINOPRIL AND HYDROCHLOROTHIAZIDE 20; 12.5 MG/1; MG/1
2 TABLET ORAL DAILY
Qty: 60 TABLET | Refills: 3 | Status: SHIPPED | OUTPATIENT
Start: 2022-09-14 | End: 2022-10-14

## 2022-09-14 RX ORDER — CLOPIDOGREL BISULFATE 75 MG/1
TABLET ORAL
Qty: 30 TABLET | Refills: 5 | Status: SHIPPED | OUTPATIENT
Start: 2022-09-14

## 2022-09-14 SDOH — ECONOMIC STABILITY: FOOD INSECURITY: WITHIN THE PAST 12 MONTHS, YOU WORRIED THAT YOUR FOOD WOULD RUN OUT BEFORE YOU GOT MONEY TO BUY MORE.: NEVER TRUE

## 2022-09-14 SDOH — ECONOMIC STABILITY: FOOD INSECURITY: WITHIN THE PAST 12 MONTHS, THE FOOD YOU BOUGHT JUST DIDN'T LAST AND YOU DIDN'T HAVE MONEY TO GET MORE.: NEVER TRUE

## 2022-09-14 ASSESSMENT — PATIENT HEALTH QUESTIONNAIRE - PHQ9
SUM OF ALL RESPONSES TO PHQ QUESTIONS 1-9: 0
1. LITTLE INTEREST OR PLEASURE IN DOING THINGS: 0
SUM OF ALL RESPONSES TO PHQ9 QUESTIONS 1 & 2: 0
SUM OF ALL RESPONSES TO PHQ QUESTIONS 1-9: 0
2. FEELING DOWN, DEPRESSED OR HOPELESS: 0

## 2022-09-14 ASSESSMENT — SOCIAL DETERMINANTS OF HEALTH (SDOH): HOW HARD IS IT FOR YOU TO PAY FOR THE VERY BASICS LIKE FOOD, HOUSING, MEDICAL CARE, AND HEATING?: NOT HARD AT ALL

## 2022-09-14 NOTE — PATIENT INSTRUCTIONS
Labs and flu shot here    Covid new booster at pharmacy    Lisinopril/hctz - increase to 40/25 (20/12.5 x 2)    Diabetic eye exam

## 2022-09-14 NOTE — PROGRESS NOTES
Angel Doe (:  1970) is a 50 y.o. male, here for evaluation of the following chief complaint(s):    Follow-up      ASSESSMENT/PLAN:  1. Diabetes mellitus type 2 with complications, uncontrolled ([facility]  Check labs on metformin and glimepiride  -     Lipid Panel; Future  -     Comprehensive Metabolic Panel; Future  -     Hemoglobin A1C; Future  -     Diabetic Foot Exam  2. Cerebrovascular accident (CVA), unspecified mechanism ([facility]  -    Stable on aspirin, statin, clopidogrel (PLAVIX) 75 MG tablet; TAKE ONE TABLET BY MOUTH DAILY, Disp-30 tablet, R-5Normal  3. Tobacco use disorder  -     nicotine (NICODERM CQ) 7 MG/24HR; Place 1 patch onto the skin daily for 14 days, Disp-28 patch, R-0Normal  4. Screening PSA (prostate specific antigen)  -     PSA Screening; Future  5. Essential hypertension  -   fair control. change to 2/d  lisinopril-hydroCHLOROthiazide (PRINZIDE;ZESTORETIC) 20-12.5 MG per     7. Systolic CHF -  Echocardiogram with reduced ejection fraction. Patient was also supposed to have heart monitor arranged but states it was not done at [facility]. He didn't fu with [facility]      Return in about 6 months (around 3/14/2023). SUBJECTIVE/OBJECTIVE:  HPI  Patient is here for routine visit. He states he feels well. He states he is compliant with medication. He is keeping busy at his job. He denies chest pain or shortness of breath. His weight is stable.     Review of Systems    Past Medical History:   Diagnosis Date    Closed fracture of right tibia and fibula     as child    COVID-19     Diabetes mellitus type 2, uncomplicated ([facility]     Hypertension     Other hyperlipidemia     Stroke ([facility] 10/3345        Systolic CHF (HCC)     echo     Tobacco use disorder        Current Outpatient Medications   Medication Sig Dispense Refill    clopidogrel (PLAVIX) 75 MG tablet TAKE ONE TABLET BY MOUTH DAILY 30 tablet 5    nicotine (NICODERM CQ) 7 MG/24HR Place 1 patch onto the skin daily for 14 days 28 patch 0    lisinopril-hydroCHLOROthiazide (PRINZIDE;ZESTORETIC) 20-12.5 MG per tablet Take 2 tablets by mouth daily 60 tablet 3    metFORMIN (GLUCOPHAGE-XR) 500 MG extended release tablet TAKE FOUR TABLETS BY MOUTH DAILY WITH BREAKFAST 120 tablet 0    atorvastatin (LIPITOR) 80 MG tablet Take 1 tablet by mouth daily 30 tablet 0    glimepiride (AMARYL) 2 MG tablet Take 1 tablet by mouth every morning (before breakfast) 30 tablet 0    aspirin EC 81 MG EC tablet Take 1 tablet by mouth daily 30 tablet 3     No current facility-administered medications for this visit. Physical Exam  Vitals reviewed. Constitutional:       General: He is not in acute distress. HENT:      Head: Normocephalic and atraumatic. Cardiovascular:      Rate and Rhythm: Normal rate and regular rhythm. Pulmonary:      Effort: Pulmonary effort is normal.      Breath sounds: Normal breath sounds. Musculoskeletal:      Right lower leg: No edema. Left lower leg: No edema. Neurological:      Mental Status: He is alert and oriented to person, place, and time. Mental status is at baseline. Psychiatric:         Mood and Affect: Mood normal.     Sensory exam of the foot is normal, tested with the monofilament. Good pulses, no lesions or ulcers, good peripheral pulses. This note was generated completely or in part utilizing Dragon dictation speech recognition software. Occasionally, words are mistranscribed and despite editing, the text may contain inaccuracies due to incorrect word recognition. If further clarification is needed please contact the office at [phone]          An electronic signature was used to authenticate this note.     --Alyse Slaughter MD

## 2022-09-23 RX ORDER — METFORMIN HYDROCHLORIDE 500 MG/1
TABLET, EXTENDED RELEASE ORAL
Qty: 120 TABLET | Refills: 0 | Status: SHIPPED | OUTPATIENT
Start: 2022-09-23 | End: 2022-10-28

## 2022-10-28 RX ORDER — METFORMIN HYDROCHLORIDE 500 MG/1
TABLET, EXTENDED RELEASE ORAL
Qty: 120 TABLET | Refills: 0 | Status: SHIPPED | OUTPATIENT
Start: 2022-10-28 | End: 2022-11-17

## 2022-11-17 DIAGNOSIS — E78.5 HYPERLIPIDEMIA, UNSPECIFIED HYPERLIPIDEMIA TYPE: ICD-10-CM

## 2022-11-17 RX ORDER — ATORVASTATIN CALCIUM 80 MG/1
TABLET, FILM COATED ORAL
Qty: 30 TABLET | Refills: 0 | Status: SHIPPED | OUTPATIENT
Start: 2022-11-17 | End: 2022-12-09

## 2022-11-17 RX ORDER — METFORMIN HYDROCHLORIDE 500 MG/1
TABLET, EXTENDED RELEASE ORAL
Qty: 120 TABLET | Refills: 0 | Status: SHIPPED | OUTPATIENT
Start: 2022-11-17 | End: 2023-01-19 | Stop reason: SDUPTHER

## 2022-11-17 RX ORDER — GLIMEPIRIDE 2 MG/1
TABLET ORAL
Qty: 30 TABLET | Refills: 0 | Status: SHIPPED | OUTPATIENT
Start: 2022-11-17 | End: 2022-12-09

## 2022-11-20 RX ORDER — LISINOPRIL AND HYDROCHLOROTHIAZIDE 25; 20 MG/1; MG/1
TABLET ORAL
Qty: 30 TABLET | Refills: 0 | OUTPATIENT
Start: 2022-11-20

## 2022-11-21 NOTE — TELEPHONE ENCOUNTER
Tell pt that he was supposed to change to lisinopril/hctz 20/12.5- take 2 per day. I sent that to his pharmacy after our last visit.

## 2022-11-23 DIAGNOSIS — I10 ESSENTIAL HYPERTENSION: ICD-10-CM

## 2022-11-28 RX ORDER — LISINOPRIL AND HYDROCHLOROTHIAZIDE 20; 12.5 MG/1; MG/1
2 TABLET ORAL DAILY
Qty: 60 TABLET | Refills: 3 | OUTPATIENT
Start: 2022-11-28 | End: 2022-12-28

## 2022-11-28 RX ORDER — LISINOPRIL AND HYDROCHLOROTHIAZIDE 25; 20 MG/1; MG/1
TABLET ORAL
Qty: 30 TABLET | Refills: 0 | Status: SHIPPED | OUTPATIENT
Start: 2022-11-28 | End: 2023-01-11

## 2022-12-09 DIAGNOSIS — E78.5 HYPERLIPIDEMIA, UNSPECIFIED HYPERLIPIDEMIA TYPE: ICD-10-CM

## 2022-12-09 RX ORDER — ATORVASTATIN CALCIUM 80 MG/1
TABLET, FILM COATED ORAL
Qty: 30 TABLET | Refills: 0 | Status: SHIPPED | OUTPATIENT
Start: 2022-12-09

## 2022-12-09 RX ORDER — GLIMEPIRIDE 2 MG/1
TABLET ORAL
Qty: 30 TABLET | Refills: 0 | Status: SHIPPED | OUTPATIENT
Start: 2022-12-09

## 2022-12-09 NOTE — TELEPHONE ENCOUNTER
Last appointment: 9/14/2022  Next appointment: 3/22/2023  Last refill: 11/17/2022 both pended medications were sent

## 2023-01-11 RX ORDER — LISINOPRIL AND HYDROCHLOROTHIAZIDE 25; 20 MG/1; MG/1
TABLET ORAL
Qty: 30 TABLET | Refills: 0 | Status: SHIPPED | OUTPATIENT
Start: 2023-01-11

## 2023-01-18 NOTE — PROGRESS NOTES
Well Adult Note  Name: William Getting Date: 2023   MRN: [id number] Sex: Male   Age: 48 y.o. Ethnicity: Non- / Non    : 1970 Race: Black /       Angel Choi is here for well adult exam.  History:    Patient is here for annual exam.    He requests Viagra. He states he is no longer taking Plavix and is now on aspirin, due to his history of stroke. He is trying to cut back on his smoking. Review of Systems   Constitutional:  Negative for fatigue and fever. HENT:  Negative for rhinorrhea, sore throat and trouble swallowing. Eyes:  Negative for visual disturbance. Respiratory:  Negative for cough and shortness of breath. Cardiovascular:  Negative for chest pain and palpitations. Gastrointestinal:  Negative for abdominal pain, diarrhea and nausea. Genitourinary:  Negative for decreased urine volume, dysuria and frequency. Musculoskeletal:  Negative for arthralgias and myalgias. Skin:  Negative for rash. Allergic/Immunologic: Negative for immunocompromised state. Neurological:  Negative for dizziness, numbness and headaches. Hematological:  Does not bruise/bleed easily. Psychiatric/Behavioral:  Negative for dysphoric mood and sleep disturbance. The patient is not nervous/anxious. No Known Allergies      Prior to Visit Medications    Medication Sig Taking?  Authorizing Provider   sildenafil (VIAGRA) 100 MG tablet Take 1 tablet by mouth as needed for Erectile Dysfunction  Ariana Vega MD   amLODIPine (NORVASC) 5 MG tablet Take 1 tablet by mouth daily  Cassandra Wilson MD   lisinopril-hydroCHLOROthiazide (PRINZIDE;ZESTORETIC) 20-25 MG per tablet TAKE ONE TABLET BY Anca Christianson MD   atorvastatin (LIPITOR) 80 MG tablet TAKE ONE TABLET BY MOUTH DAILY  Ariana Duarte MD   glimepiride (AMARYL) 2 MG tablet TAKE ONE TABLET BY MOUTH EVERY MORNING BEFORE BREAKFAST  Ariana Deni Johnson MD   metFORMIN (GLUCOPHAGE-XR) 500 MG extended release tablet TAKE FOUR TABLETS BY MOUTH DAILY WITH BREAKFAST  Hamlet Leon MD   aspirin EC 81 MG EC tablet Take 1 tablet by mouth daily  Hamlet Leon MD         Past Medical History:   Diagnosis Date    Closed fracture of right tibia and fibula     as child    COVID-19     Diabetes mellitus type 2, uncomplicated ([facility]     Hypertension     Other hyperlipidemia     [facility] 21/9104    uc    Systolic [facility]F ([facility]     echo 9/21    Tobacco use disorder        History reviewed. No pertinent surgical history. Family History   Problem Relation Age of Onset    Cancer Mother         leukemia    High Blood Pressure Father        Social History     Tobacco Use    Smoking status: Every Day     Packs/day: 0.25     Types: Cigarettes    Smokeless tobacco: Current     Types: Chew   Substance Use Topics    Alcohol use: Yes    Drug use: Not Currently       Objective   BP (!) 142/78   Pulse 78   Ht 5' 7\" (1.702 m)   Wt 224 lb 1.6 oz (101.7 kg)   SpO2 99%   BMI 35.10 kg/m²   Wt Readings from Last 3 Encounters:   01/19/23 224 lb 1.6 oz (101.7 kg)   09/14/22 216 lb 8 oz (98.2 kg)   11/01/21 213 lb (96.6 kg)     There were no vitals filed for this visit. Physical Exam  Vitals and nursing note reviewed. Constitutional:       General: He is not in acute distress. Appearance: He is well-developed. HENT:      Head: Normocephalic and atraumatic. Right Ear: External ear normal.      Left Ear: External ear normal.      Nose: Nose normal.   Eyes:      General: No scleral icterus. Pupils: Pupils are equal, round, and reactive to light. Neck:      Thyroid: No thyromegaly. Cardiovascular:      Rate and Rhythm: Normal rate and regular rhythm. Heart sounds: No murmur heard. Pulmonary:      Effort: No respiratory distress. Breath sounds: Normal breath sounds. No wheezing or rales.    Abdominal:      General: Bowel sounds are normal. There is no distension. Palpations: Abdomen is soft. Tenderness: There is no abdominal tenderness. Musculoskeletal:         General: No deformity. Normal range of motion. Cervical back: Normal range of motion. Lymphadenopathy:      Cervical: No cervical adenopathy. Skin:     General: Skin is warm and dry. Neurological:      Mental Status: He is alert and oriented to person, place, and time. Cranial Nerves: No cranial nerve deficit. Sensory: No sensory deficit. Coordination: Coordination normal.   Psychiatric:         Thought Content: Thought content normal.     160/90 for me (he states he was up all PM)    Assessment   Plan   1. Encounter for well adult exam without abnormal findings  Age and gender appropriate preventive healthcare addressed  2. Hyperlipidemia, unspecified hyperlipidemia type  Stable on statin  3. Essential hypertension  Not well controlled today. We will add amlodipine 5 mg daily. Continue Zestoretic. 4. Controlled type 2 diabetes mellitus with complication, without long-term current use of insulin ([facility])  Check labs on metformin and glimepiride  -     Microalbumin / Creatinine Urine Ratio  -     Lipid Panel; Future  -     Comprehensive Metabolic Panel; Future  -     Vitamin B12; Future  -     Hemoglobin A1C; Future  5. Screen for colon cancer  -     Fecal DNA Colorectal cancer screening (Cologuard)  6. Cerebrovascular accident (CVA), unspecified mechanism ([facility]  -     CBC; Future   Stable on aspirin, statin     Tobacco use disorder -encouraged patient to fully quit    --  Systolic CHF -  Echocardiogram with reduced ejection fraction. Patient was also supposed to have heart monitor arranged but states it was not done at [facility].  [id number]         Personalized Preventive Plan   Current Health Maintenance Status  Immunization History   Administered Date(s) Administered    COVID-19, PFIZER PURPLE top, DILUTE for use, (age 15 y+), 30mcg/0.3mL 10/29/2021, 11/16/2021    Hepatitis B Adult (Engerix-B) 11/05/2021    Hepatitis B Adult (Heplisav-b) 01/19/2023    Influenza, FLUCELVAX, (age 10 mo+), MDCK, PF, 0.5mL 09/22/2021, 09/14/2022    Pneumococcal Polysaccharide (Nwommeirc17) 09/22/2021    Tdap (Boostrix, Adacel) 05/06/2021        Health Maintenance   Topic Date Due    Diabetic retinal exam  Never done    Colorectal Cancer Screen  Never done    COVID-19 Vaccine (3 - Booster for Pfizer series) 01/11/2022    Shingles vaccine (1 of 2) Never done    Hepatitis B vaccine (3 of 3 - Risk 3-dose series) 05/19/2023    Diabetic foot exam  09/14/2023    A1C test (Diabetic or Prediabetic)  09/14/2023    Lipids  09/14/2023    GFR test (Diabetes, CKD 3-4, OR last GFR 15-59)  09/14/2023    Diabetic Alb to Cr ratio (uACR) test  01/19/2024    Depression Screen  01/19/2024    DTaP/Tdap/Td vaccine (2 - Td or Tdap) 05/06/2031    Flu vaccine  Completed    Pneumococcal 0-64 years Vaccine  Completed    Hepatitis C screen  Completed    HIV screen  Completed    Hepatitis A vaccine  Aged Out    Hib vaccine  Aged Out    Meningococcal (ACWY) vaccine  Aged Out     Recommendations for [employer] Due: see orders and patient instructions/AVS.    Return in about 6 weeks (around 3/1/2023).

## 2023-01-19 ENCOUNTER — TELEPHONE (OUTPATIENT)
Dept: INTERNAL MEDICINE CLINIC | Age: 53
End: 2023-01-19

## 2023-01-19 ENCOUNTER — OFFICE VISIT (OUTPATIENT)
Dept: INTERNAL MEDICINE CLINIC | Age: 53
End: 2023-01-19

## 2023-01-19 VITALS
HEIGHT: 67 IN | WEIGHT: 224.1 LBS | HEART RATE: 78 BPM | BODY MASS INDEX: 35.17 KG/M2 | OXYGEN SATURATION: 99 % | SYSTOLIC BLOOD PRESSURE: 142 MMHG | DIASTOLIC BLOOD PRESSURE: 78 MMHG

## 2023-01-19 DIAGNOSIS — Z00.00 ENCOUNTER FOR WELL ADULT EXAM WITHOUT ABNORMAL FINDINGS: Primary | ICD-10-CM

## 2023-01-19 DIAGNOSIS — E11.8 CONTROLLED TYPE 2 DIABETES MELLITUS WITH COMPLICATION, WITHOUT LONG-TERM CURRENT USE OF INSULIN (HCC): ICD-10-CM

## 2023-01-19 DIAGNOSIS — I63.9 CEREBROVASCULAR ACCIDENT (CVA), UNSPECIFIED MECHANISM (HCC): ICD-10-CM

## 2023-01-19 DIAGNOSIS — I10 ESSENTIAL HYPERTENSION: ICD-10-CM

## 2023-01-19 DIAGNOSIS — E78.5 HYPERLIPIDEMIA, UNSPECIFIED HYPERLIPIDEMIA TYPE: ICD-10-CM

## 2023-01-19 DIAGNOSIS — Z12.11 SCREEN FOR COLON CANCER: ICD-10-CM

## 2023-01-19 LAB
CREATININE URINE: 86.1 MG/DL (ref 39–259)
MICROALBUMIN UR-MCNC: <1.2 MG/DL
MICROALBUMIN/CREAT UR-RTO: NORMAL MG/G (ref 0–30)

## 2023-01-19 RX ORDER — AMLODIPINE BESYLATE 5 MG/1
5 TABLET ORAL DAILY
Qty: 30 TABLET | Refills: 5 | Status: SHIPPED | OUTPATIENT
Start: 2023-01-19 | End: 2023-01-20 | Stop reason: SDUPTHER

## 2023-01-19 RX ORDER — SILDENAFIL 100 MG/1
100 TABLET, FILM COATED ORAL PRN
Qty: 10 TABLET | Refills: 0 | Status: SHIPPED | OUTPATIENT
Start: 2023-01-19 | End: 2023-01-20 | Stop reason: SDUPTHER

## 2023-01-19 RX ORDER — ATORVASTATIN CALCIUM 80 MG/1
TABLET, FILM COATED ORAL
Qty: 90 TABLET | Refills: 3 | Status: SHIPPED | OUTPATIENT
Start: 2023-01-19 | End: 2023-01-20 | Stop reason: SDUPTHER

## 2023-01-19 RX ORDER — METFORMIN HYDROCHLORIDE 500 MG/1
TABLET, EXTENDED RELEASE ORAL
Qty: 360 TABLET | Refills: 1 | Status: SHIPPED | OUTPATIENT
Start: 2023-01-19 | End: 2023-01-20 | Stop reason: SDUPTHER

## 2023-01-19 RX ORDER — GLIMEPIRIDE 2 MG/1
TABLET ORAL
Qty: 90 TABLET | Refills: 3 | Status: SHIPPED | OUTPATIENT
Start: 2023-01-19 | End: 2023-01-20 | Stop reason: SDUPTHER

## 2023-01-19 RX ORDER — ASPIRIN 81 MG/1
81 TABLET ORAL DAILY
Qty: 90 TABLET | Refills: 3 | Status: SHIPPED | OUTPATIENT
Start: 2023-01-19 | End: 2023-01-20 | Stop reason: SDUPTHER

## 2023-01-19 RX ORDER — LISINOPRIL AND HYDROCHLOROTHIAZIDE 25; 20 MG/1; MG/1
TABLET ORAL
Qty: 90 TABLET | Refills: 3 | Status: SHIPPED | OUTPATIENT
Start: 2023-01-19 | End: 2023-01-20 | Stop reason: SDUPTHER

## 2023-01-19 ASSESSMENT — PATIENT HEALTH QUESTIONNAIRE - PHQ9
1. LITTLE INTEREST OR PLEASURE IN DOING THINGS: 0
2. FEELING DOWN, DEPRESSED OR HOPELESS: 0
SUM OF ALL RESPONSES TO PHQ9 QUESTIONS 1 & 2: 0
SUM OF ALL RESPONSES TO PHQ QUESTIONS 1-9: 0

## 2023-01-19 NOTE — TELEPHONE ENCOUNTER
Pt's pharmacy stated his insurance no loner cover the listed medication     sildenafil (VIAGRA)      Alternate will need to be sent

## 2023-01-19 NOTE — PATIENT INSTRUCTIONS
Diabetic retinal exam  [address] new booster at pharmacy  Shingrix at pharmacy - 2 shots over 6 months    Hepatitis B shot    Labs early march- fasting  Urine    Add amlodipine 5 mg daily

## 2023-01-20 DIAGNOSIS — I63.9 CEREBROVASCULAR ACCIDENT (CVA), UNSPECIFIED MECHANISM (HCC): ICD-10-CM

## 2023-01-20 DIAGNOSIS — E78.5 HYPERLIPIDEMIA, UNSPECIFIED HYPERLIPIDEMIA TYPE: ICD-10-CM

## 2023-01-20 RX ORDER — ATORVASTATIN CALCIUM 80 MG/1
TABLET, FILM COATED ORAL
Qty: 90 TABLET | Refills: 3 | Status: SHIPPED | OUTPATIENT
Start: 2023-01-20

## 2023-01-20 RX ORDER — ASPIRIN 81 MG/1
81 TABLET ORAL DAILY
Qty: 90 TABLET | Refills: 3 | Status: SHIPPED | OUTPATIENT
Start: 2023-01-20

## 2023-01-20 RX ORDER — AMLODIPINE BESYLATE 5 MG/1
5 TABLET ORAL DAILY
Qty: 30 TABLET | Refills: 5 | Status: SHIPPED | OUTPATIENT
Start: 2023-01-20

## 2023-01-20 RX ORDER — SILDENAFIL 100 MG/1
100 TABLET, FILM COATED ORAL PRN
Qty: 10 TABLET | Refills: 0 | Status: SHIPPED | OUTPATIENT
Start: 2023-01-20

## 2023-01-20 RX ORDER — METFORMIN HYDROCHLORIDE 500 MG/1
TABLET, EXTENDED RELEASE ORAL
Qty: 360 TABLET | Refills: 1 | Status: SHIPPED | OUTPATIENT
Start: 2023-01-20

## 2023-01-20 RX ORDER — LISINOPRIL AND HYDROCHLOROTHIAZIDE 25; 20 MG/1; MG/1
TABLET ORAL
Qty: 90 TABLET | Refills: 3 | Status: SHIPPED | OUTPATIENT
Start: 2023-01-20

## 2023-01-20 RX ORDER — GLIMEPIRIDE 2 MG/1
TABLET ORAL
Qty: 90 TABLET | Refills: 3 | Status: SHIPPED | OUTPATIENT
Start: 2023-01-20

## 2023-01-20 NOTE — TELEPHONE ENCOUNTER
Pt made aware and wife was advised to call pharmacy due to this medication being denied and well as other medications

## 2023-01-20 NOTE — TELEPHONE ENCOUNTER
Ask pt to call his insurance company to see which medications for ED are covered since apparently Viagra is not.

## 2023-01-24 RX ORDER — SILDENAFIL 100 MG/1
100 TABLET, FILM COATED ORAL PRN
Qty: 10 TABLET | Refills: 0 | OUTPATIENT
Start: 2023-01-24

## 2023-01-24 NOTE — TELEPHONE ENCOUNTER
Last appointment: 1/19/2023  Next appointment: 3/2/2023  Last refill: 1/20/23 the script went to the new requested pharmacy

## 2023-01-29 ASSESSMENT — ENCOUNTER SYMPTOMS
NAUSEA: 0
SORE THROAT: 0
TROUBLE SWALLOWING: 0
COUGH: 0
DIARRHEA: 0
SHORTNESS OF BREATH: 0
RHINORRHEA: 0
ABDOMINAL PAIN: 0

## 2023-03-02 ENCOUNTER — OFFICE VISIT (OUTPATIENT)
Dept: INTERNAL MEDICINE CLINIC | Age: 53
End: 2023-03-02

## 2023-03-02 VITALS
WEIGHT: 221.6 LBS | BODY MASS INDEX: 34.71 KG/M2 | OXYGEN SATURATION: 96 % | SYSTOLIC BLOOD PRESSURE: 148 MMHG | DIASTOLIC BLOOD PRESSURE: 82 MMHG | HEART RATE: 88 BPM

## 2023-03-02 DIAGNOSIS — E11.8 CONTROLLED TYPE 2 DIABETES MELLITUS WITH COMPLICATION, WITHOUT LONG-TERM CURRENT USE OF INSULIN (HCC): Primary | ICD-10-CM

## 2023-03-02 DIAGNOSIS — E78.5 HYPERLIPIDEMIA, UNSPECIFIED HYPERLIPIDEMIA TYPE: ICD-10-CM

## 2023-03-02 DIAGNOSIS — I50.20 SYSTOLIC CONGESTIVE HEART FAILURE, UNSPECIFIED HF CHRONICITY (HCC): ICD-10-CM

## 2023-03-02 DIAGNOSIS — J06.9 URI WITH COUGH AND CONGESTION: ICD-10-CM

## 2023-03-02 DIAGNOSIS — I63.9 CEREBROVASCULAR ACCIDENT (CVA), UNSPECIFIED MECHANISM (HCC): ICD-10-CM

## 2023-03-02 DIAGNOSIS — Z12.11 SCREEN FOR COLON CANCER: ICD-10-CM

## 2023-03-02 DIAGNOSIS — I10 ESSENTIAL HYPERTENSION: ICD-10-CM

## 2023-03-02 DIAGNOSIS — E11.8 CONTROLLED TYPE 2 DIABETES MELLITUS WITH COMPLICATION, WITHOUT LONG-TERM CURRENT USE OF INSULIN (HCC): ICD-10-CM

## 2023-03-02 LAB
A/G RATIO: 1.3 (ref 1.1–2.2)
ALBUMIN SERPL-MCNC: 4.2 G/DL (ref 3.4–5)
ALP BLD-CCNC: 83 U/L (ref 40–129)
ALT SERPL-CCNC: 29 U/L (ref 10–40)
ANION GAP SERPL CALCULATED.3IONS-SCNC: 13 MMOL/L (ref 3–16)
AST SERPL-CCNC: 26 U/L (ref 15–37)
BILIRUB SERPL-MCNC: <0.2 MG/DL (ref 0–1)
BUN BLDV-MCNC: 17 MG/DL (ref 7–20)
CALCIUM SERPL-MCNC: 9.8 MG/DL (ref 8.3–10.6)
CHLORIDE BLD-SCNC: 103 MMOL/L (ref 99–110)
CHOLESTEROL, TOTAL: 159 MG/DL (ref 0–199)
CO2: 22 MMOL/L (ref 21–32)
CREAT SERPL-MCNC: 1.1 MG/DL (ref 0.9–1.3)
GFR SERPL CREATININE-BSD FRML MDRD: >60 ML/MIN/{1.73_M2}
GLUCOSE BLD-MCNC: 73 MG/DL (ref 70–99)
HCT VFR BLD CALC: 44.2 % (ref 40.5–52.5)
HDLC SERPL-MCNC: 38 MG/DL (ref 40–60)
HEMOGLOBIN: 15 G/DL (ref 13.5–17.5)
LDL CHOLESTEROL CALCULATED: 92 MG/DL
MCH RBC QN AUTO: 32 PG (ref 26–34)
MCHC RBC AUTO-ENTMCNC: 33.9 G/DL (ref 31–36)
MCV RBC AUTO: 94.3 FL (ref 80–100)
PDW BLD-RTO: 15.1 % (ref 12.4–15.4)
PLATELET # BLD: 314 K/UL (ref 135–450)
PMV BLD AUTO: 10.2 FL (ref 5–10.5)
POTASSIUM SERPL-SCNC: 4.6 MMOL/L (ref 3.5–5.1)
RBC # BLD: 4.68 M/UL (ref 4.2–5.9)
SODIUM BLD-SCNC: 138 MMOL/L (ref 136–145)
TOTAL PROTEIN: 7.4 G/DL (ref 6.4–8.2)
TRIGL SERPL-MCNC: 146 MG/DL (ref 0–150)
VLDLC SERPL CALC-MCNC: 29 MG/DL
WBC # BLD: 5.9 K/UL (ref 4–11)

## 2023-03-02 RX ORDER — AMLODIPINE BESYLATE 5 MG/1
7.5 TABLET ORAL DAILY
Qty: 135 TABLET | Refills: 1 | Status: SHIPPED | OUTPATIENT
Start: 2023-03-02 | End: 2023-05-31

## 2023-03-02 SDOH — ECONOMIC STABILITY: HOUSING INSECURITY
IN THE LAST 12 MONTHS, WAS THERE A TIME WHEN YOU DID NOT HAVE A STEADY PLACE TO SLEEP OR SLEPT IN A SHELTER (INCLUDING NOW)?: NO

## 2023-03-02 SDOH — ECONOMIC STABILITY: FOOD INSECURITY: WITHIN THE PAST 12 MONTHS, THE FOOD YOU BOUGHT JUST DIDN'T LAST AND YOU DIDN'T HAVE MONEY TO GET MORE.: NEVER TRUE

## 2023-03-02 SDOH — ECONOMIC STABILITY: FOOD INSECURITY: WITHIN THE PAST 12 MONTHS, YOU WORRIED THAT YOUR FOOD WOULD RUN OUT BEFORE YOU GOT MONEY TO BUY MORE.: NEVER TRUE

## 2023-03-02 SDOH — ECONOMIC STABILITY: INCOME INSECURITY: HOW HARD IS IT FOR YOU TO PAY FOR THE VERY BASICS LIKE FOOD, HOUSING, MEDICAL CARE, AND HEATING?: NOT HARD AT ALL

## 2023-03-02 NOTE — PATIENT INSTRUCTIONS
Shingrix at pharmacy    Fit test    Increase to amlodipine 7.5 mg (1.5 of the 5mg)  Get a tablet splitter to help    Claritin and Flonase OTC for symptoms    Get the labs ordered in 1/23

## 2023-03-02 NOTE — PROGRESS NOTES
Angel Doe (:  1970) is a 48 y.o. male, here for evaluation of the following chief complaint(s):    Follow-up      ASSESSMENT/PLAN:  1. Controlled type 2 diabetes mellitus with complication, without long-term current use of insulin (HCC)  Check labs on metformin and glimepiride  2. Essential hypertension  Fair control. Increase to amlodipine 7.5 mg daily and continue Continue Zestoretic.    -     amLODIPine (NORVASC) 5 MG tablet; Take 1.5 tablets by mouth daily, Disp-135 tablet, R-1Normal  3. Hyperlipidemia, unspecified hyperlipidemia type  Stable on statin  4. Systolic congestive heart failure, unspecified HF chronicity ([facility] [address]  EF 40-45%  Echo. Again encouraged him to get established with cardiology  5. URI with cough and congestion  -     POCT COVID-19 Rapid, NAAT negative. Advised patient to let me know if congestion worsens. He has what sounds like allergy symptoms and I advised Claritin and Flonase. 6. Screen for colon cancer  -     POCT Fecal Immunochemical Test (FIT); Future  Cerebrovascular accident (CVA), unspecified mechanism ([facility] [address]    Stable on aspirin, statin      Tobacco use disorder -encouraged patient to fully quit       Return in about 3 months (around 2023) for hypertension. SUBJECTIVE/OBJECTIVE:  HPI  Patient is here for routine visit. He reports compliance with his prescribed medications. He has been feeling well and working a lot of hours until the past few days when he has felt feverish, had an itchy nose, been fatigued. He is taking ibuprofen which helps. He has also had a little mucousy cough in his chest.    He notes that his weight is up and he thinks his diabetes medications are making him hungry. The ASCVD Risk score (Celeste MENDOZA, et al., 2019) failed to calculate for the following reasons:     The patient has a prior MI or stroke diagnosis      Review of Systems    Past Medical History:   Diagnosis Date    Closed fracture of right tibia and fibula as child    COVID-19     Diabetes mellitus type 2, uncomplicated ([facility] [address]     Hypertension     Other hyperlipidemia     [facility] 62/0880        Systolic CHF (HCC)     echo 9/21    Tobacco use disorder        Current Outpatient Medications   Medication Sig Dispense Refill    amLODIPine (NORVASC) 5 MG tablet Take 1.5 tablets by mouth daily 135 tablet 1    sildenafil (VIAGRA) 100 MG tablet Take 1 tablet by mouth as needed for Erectile Dysfunction 10 tablet 0    lisinopril-hydroCHLOROthiazide (PRINZIDE;ZESTORETIC) 20-25 MG per tablet TAKE ONE TABLET BY MOUTH DAILY 90 tablet 3    atorvastatin (LIPITOR) 80 MG tablet TAKE ONE TABLET BY MOUTH DAILY 90 tablet 3    glimepiride (AMARYL) 2 MG tablet TAKE ONE TABLET BY MOUTH EVERY MORNING BEFORE BREAKFAST 90 tablet 3    metFORMIN (GLUCOPHAGE-XR) 500 MG extended release tablet TAKE FOUR TABLETS BY MOUTH DAILY WITH BREAKFAST 360 tablet 1    aspirin EC 81 MG EC tablet Take 1 tablet by mouth daily 90 tablet 3     No current facility-administered medications for this visit. Physical Exam  Vitals and nursing note reviewed. Constitutional:       General: He is not in acute distress. Appearance: He is well-developed. He is not diaphoretic. HENT:      Right Ear: External ear normal.      Left Ear: External ear normal.      Nose: Nose normal.      Mouth/Throat:      Pharynx: No oropharyngeal exudate. Eyes:      General:         Right eye: No discharge. Left eye: No discharge. Conjunctiva/sclera: Conjunctivae normal.   Cardiovascular:      Rate and Rhythm: Normal rate and regular rhythm. Pulmonary:      Effort: No respiratory distress. Breath sounds: Normal breath sounds. Lymphadenopathy:      Cervical: No cervical adenopathy. Neurological:      Mental Status: He is alert and oriented to person, place, and time. This note was generated completely or in part utilizing Dragon dictation speech recognition software.   Occasionally, words are mistranscribed and despite editing, the text may contain inaccuracies due to incorrect word recognition.  If further clarification is needed please contact the office at [phone]          An electronic signature was used to authenticate this note.    --ANTONY LITTLE MD

## 2023-03-02 NOTE — Clinical Note
Please advise patient that due to his excellent A1c, he can reduce his glimepiride to 1 mg daily. A new prescription is being sent.

## 2023-03-03 LAB
ESTIMATED AVERAGE GLUCOSE: 128.4 MG/DL
HBA1C MFR BLD: 6.1 %
VITAMIN B-12: 660 PG/ML (ref 211–911)

## 2023-03-04 PROBLEM — I50.20 SYSTOLIC CONGESTIVE HEART FAILURE, UNSPECIFIED HF CHRONICITY (HCC): Status: ACTIVE | Noted: 2023-03-04

## 2023-03-04 RX ORDER — GLIMEPIRIDE 1 MG/1
TABLET ORAL
Qty: 90 TABLET | Refills: 1 | Status: SHIPPED | OUTPATIENT
Start: 2023-03-04

## 2023-04-14 ENCOUNTER — TELEMEDICINE (OUTPATIENT)
Dept: INTERNAL MEDICINE CLINIC | Age: 53
End: 2023-04-14
Payer: COMMERCIAL

## 2023-04-14 DIAGNOSIS — Z02.9 ADMINISTRATIVE ENCOUNTER: Primary | ICD-10-CM

## 2023-04-14 PROCEDURE — 99442 PR PHYS/QHP TELEPHONE EVALUATION 11-20 MIN: CPT | Performed by: INTERNAL MEDICINE

## 2023-05-31 ENCOUNTER — OFFICE VISIT (OUTPATIENT)
Dept: INTERNAL MEDICINE CLINIC | Age: 53
End: 2023-05-31
Payer: COMMERCIAL

## 2023-05-31 VITALS
DIASTOLIC BLOOD PRESSURE: 90 MMHG | BODY MASS INDEX: 34.3 KG/M2 | HEART RATE: 88 BPM | WEIGHT: 219 LBS | RESPIRATION RATE: 14 BRPM | SYSTOLIC BLOOD PRESSURE: 138 MMHG | OXYGEN SATURATION: 98 %

## 2023-05-31 DIAGNOSIS — Z12.11 SCREEN FOR COLON CANCER: ICD-10-CM

## 2023-05-31 DIAGNOSIS — E11.8 CONTROLLED TYPE 2 DIABETES MELLITUS WITH COMPLICATION, WITHOUT LONG-TERM CURRENT USE OF INSULIN (HCC): ICD-10-CM

## 2023-05-31 DIAGNOSIS — I50.20 SYSTOLIC CONGESTIVE HEART FAILURE, UNSPECIFIED HF CHRONICITY (HCC): ICD-10-CM

## 2023-05-31 DIAGNOSIS — I10 ESSENTIAL HYPERTENSION: Primary | ICD-10-CM

## 2023-05-31 DIAGNOSIS — E78.5 HYPERLIPIDEMIA, UNSPECIFIED HYPERLIPIDEMIA TYPE: ICD-10-CM

## 2023-05-31 PROCEDURE — 99214 OFFICE O/P EST MOD 30 MIN: CPT | Performed by: INTERNAL MEDICINE

## 2023-05-31 PROCEDURE — 3044F HG A1C LEVEL LT 7.0%: CPT | Performed by: INTERNAL MEDICINE

## 2023-05-31 PROCEDURE — 90739 HEPB VACC 2/4 DOSE ADULT IM: CPT | Performed by: INTERNAL MEDICINE

## 2023-05-31 PROCEDURE — 90471 IMMUNIZATION ADMIN: CPT | Performed by: INTERNAL MEDICINE

## 2023-05-31 PROCEDURE — 3075F SYST BP GE 130 - 139MM HG: CPT | Performed by: INTERNAL MEDICINE

## 2023-05-31 PROCEDURE — 3080F DIAST BP >= 90 MM HG: CPT | Performed by: INTERNAL MEDICINE

## 2023-05-31 NOTE — PROGRESS NOTES
Angel Doe (:  1970) is a 48 y.o. male, here for evaluation of the following chief complaint(s):    Follow-up and Hypertension      ASSESSMENT/PLAN:  1. Essential hypertension  Fair control. Patient is uncertain about his medications. Told him to go home and check his pill bottles and call me. For now we will continue on current medications of amlodipine and Zestoretic. 2. Hyperlipidemia, unspecified hyperlipidemia type  Stable on statin  3. Systolic congestive heart failure, unspecified HF chronicity (HCC)  -     ECHO Complete 2D W Doppler W Color; Future  Patient did not follow-up with cardiology as directed  4. Controlled type 2 diabetes mellitus with complication, without long-term current use of insulin (HCC)  Stable on metformin and glimepiride  5. Screen for colon cancer  -     [facility]    Cerebrovascular accident (CVA), unspecified mechanism ([facility] [address]    Stable on aspirin, statin      Tobacco use disorder -encouraged patient to fully quit  Return in about 4 weeks (around 2023). SUBJECTIVE/OBJECTIVE:  HPI  Patient is here for routine visit. He feels well. He is uncertain about his medications. Sounds like he may be missing one of his blood pressure meds. He states he walks daily to keep his weight down. He denies chest pain or shortness of breath.     Review of Systems    Past Medical History:   Diagnosis Date    Closed fracture of right tibia and fibula     as child    COVID-19     Diabetes mellitus type 2, uncomplicated ([facility] [address]     Hypertension     Other hyperlipidemia     [facility] 2682        Systolic CHF (HCC)     echo     Tobacco use disorder        Current Outpatient Medications   Medication Sig Dispense Refill    glimepiride (AMARYL) 1 MG tablet TAKE ONE TABLET BY MOUTH EVERY MORNING BEFORE BREAKFAST 90 tablet 1    amLODIPine (NORVASC) 5 MG tablet Take 1.5 tablets by mouth daily 135 tablet 1

## 2023-05-31 NOTE — PATIENT INSTRUCTIONS
Check home meds to make sure you're taking all and let me know    Hepatitis  B shot today    Shingrix at pharmacy    Cologuard to screen for colon cancer    Echo  - heart ultrasound  [id number]

## 2023-07-21 RX ORDER — METFORMIN HYDROCHLORIDE 500 MG/1
TABLET, EXTENDED RELEASE ORAL
Qty: 360 TABLET | Refills: 1 | Status: SHIPPED | OUTPATIENT
Start: 2023-07-21

## 2023-08-04 NOTE — PROGRESS NOTES
EVERY MORNING BEFORE BREAKFAST 90 tablet 1    amLODIPine (NORVASC) 5 MG tablet Take 1.5 tablets by mouth daily 135 tablet 1    lisinopril-hydroCHLOROthiazide (PRINZIDE;ZESTORETIC) 20-25 MG per tablet TAKE ONE TABLET BY MOUTH DAILY 90 tablet 3    atorvastatin (LIPITOR) 80 MG tablet TAKE ONE TABLET BY MOUTH DAILY 90 tablet 3    aspirin EC 81 MG EC tablet Take 1 tablet by mouth daily 90 tablet 3    sildenafil (VIAGRA) 100 MG tablet Take 1 tablet by mouth as needed for Erectile Dysfunction (Patient not taking: Reported on 5/31/2023) 10 tablet 0     No current facility-administered medications for this visit. Physical Exam  Vitals and nursing note reviewed. Constitutional:       General: He is not in acute distress. Appearance: He is well-developed. HENT:      Head: Normocephalic and atraumatic. Right Ear: External ear normal.      Left Ear: External ear normal.      Nose: Nose normal.   Eyes:      General: No scleral icterus. Pupils: Pupils are equal, round, and reactive to light. Neck:      Thyroid: No thyromegaly. Cardiovascular:      Rate and Rhythm: Normal rate and regular rhythm. Heart sounds: No murmur heard. Pulmonary:      Effort: No respiratory distress. Breath sounds: Normal breath sounds. No wheezing or rales. Abdominal:      General: Bowel sounds are normal. There is no distension. Palpations: Abdomen is soft. Tenderness: There is no abdominal tenderness. Musculoskeletal:         General: No deformity. Normal range of motion. Cervical back: Normal range of motion. Lymphadenopathy:      Cervical: No cervical adenopathy. Skin:     General: Skin is warm and dry. Neurological:      Mental Status: He is alert and oriented to person, place, and time. Cranial Nerves: No cranial nerve deficit. Sensory: No sensory deficit. Coordination: Coordination normal.   Psychiatric:         Thought Content:  Thought content normal.

## 2023-08-09 ENCOUNTER — OFFICE VISIT (OUTPATIENT)
Dept: INTERNAL MEDICINE CLINIC | Age: 53
End: 2023-08-09
Payer: COMMERCIAL

## 2023-08-09 VITALS
DIASTOLIC BLOOD PRESSURE: 72 MMHG | OXYGEN SATURATION: 98 % | BODY MASS INDEX: 34.14 KG/M2 | SYSTOLIC BLOOD PRESSURE: 130 MMHG | HEART RATE: 88 BPM | WEIGHT: 218 LBS

## 2023-08-09 DIAGNOSIS — E78.5 HYPERLIPIDEMIA, UNSPECIFIED HYPERLIPIDEMIA TYPE: ICD-10-CM

## 2023-08-09 DIAGNOSIS — E11.8 CONTROLLED TYPE 2 DIABETES MELLITUS WITH COMPLICATION, WITHOUT LONG-TERM CURRENT USE OF INSULIN (HCC): ICD-10-CM

## 2023-08-09 DIAGNOSIS — I10 ESSENTIAL HYPERTENSION: Primary | ICD-10-CM

## 2023-08-09 DIAGNOSIS — Z12.5 SCREENING PSA (PROSTATE SPECIFIC ANTIGEN): ICD-10-CM

## 2023-08-09 DIAGNOSIS — Z12.11 SCREEN FOR COLON CANCER: ICD-10-CM

## 2023-08-09 PROCEDURE — 3078F DIAST BP <80 MM HG: CPT | Performed by: INTERNAL MEDICINE

## 2023-08-09 PROCEDURE — 90750 HZV VACC RECOMBINANT IM: CPT | Performed by: INTERNAL MEDICINE

## 2023-08-09 PROCEDURE — 90471 IMMUNIZATION ADMIN: CPT | Performed by: INTERNAL MEDICINE

## 2023-08-09 PROCEDURE — 3044F HG A1C LEVEL LT 7.0%: CPT | Performed by: INTERNAL MEDICINE

## 2023-08-09 PROCEDURE — 3075F SYST BP GE 130 - 139MM HG: CPT | Performed by: INTERNAL MEDICINE

## 2023-08-09 PROCEDURE — 99214 OFFICE O/P EST MOD 30 MIN: CPT | Performed by: INTERNAL MEDICINE

## 2023-08-09 ASSESSMENT — ENCOUNTER SYMPTOMS: SHORTNESS OF BREATH: 0

## 2023-08-09 NOTE — PATIENT INSTRUCTIONS
September: due flu, covid  --  Shingles 1 today, and shingles 2 today in 2-6 months    Colonoscopy    Fasting labs in mid-September    Echo  [id number]

## 2023-08-16 DIAGNOSIS — I10 ESSENTIAL HYPERTENSION: ICD-10-CM

## 2023-08-16 RX ORDER — AMLODIPINE BESYLATE 5 MG/1
7.5 TABLET ORAL DAILY
Qty: 135 TABLET | Refills: 0 | Status: SHIPPED | OUTPATIENT
Start: 2023-08-16

## 2023-08-16 NOTE — TELEPHONE ENCOUNTER
Last appointment: 8/9/2023  Next appointment: Visit date not found  Last refill: 3/2/2023  #135 x1  Appointment not due for >6 months.

## 2024-02-21 DIAGNOSIS — E78.5 HYPERLIPIDEMIA, UNSPECIFIED HYPERLIPIDEMIA TYPE: ICD-10-CM

## 2024-02-21 NOTE — TELEPHONE ENCOUNTER
Requested Prescriptions     Pending Prescriptions Disp Refills    lisinopril-hydroCHLOROthiazide (PRINZIDE;ZESTORETIC) 20-25 MG per tablet [Pharmacy Med Name: LISINOPRIL-HCTZ 20-25 MG TAB] 90 tablet 3     Sig: TAKE 1 TABLET BY MOUTH DAILY    atorvastatin (LIPITOR) 80 MG tablet [Pharmacy Med Name: ATORVASTATIN 80 MG TABLET] 90 tablet 3     Sig: TAKE 1 TABLET BY MOUTH DAILY    glimepiride (AMARYL) 2 MG tablet [Pharmacy Med Name: GLIMEPIRIDE 2 MG TABLET] 90 tablet 3     Sig: TAKE 1 TABLET BY MOUTH EVERY MORNING BEFORE BREAKFAST    metFORMIN (GLUCOPHAGE-XR) 500 MG extended release tablet [Pharmacy Med Name: METFORMIN HCL  MG TABLET] 360 tablet 1     Sig: TAKE FOUR TABLETS BY MOUTH DAILY WITH BREAKFAST     Last OV - 8/9/23  Next OV - NONE  Last refill - 1/20/23, 3/4/23, 7/21/23  Last labs - 3/2/23

## 2024-02-22 DIAGNOSIS — I63.9 CEREBROVASCULAR ACCIDENT (CVA), UNSPECIFIED MECHANISM (HCC): ICD-10-CM

## 2024-02-22 RX ORDER — ASPIRIN 81 MG/1
81 TABLET, COATED ORAL DAILY
Qty: 90 TABLET | Refills: 3 | Status: SHIPPED | OUTPATIENT
Start: 2024-02-22

## 2024-02-22 RX ORDER — LISINOPRIL AND HYDROCHLOROTHIAZIDE 25; 20 MG/1; MG/1
TABLET ORAL
Qty: 30 TABLET | Refills: 0 | Status: SHIPPED | OUTPATIENT
Start: 2024-02-22

## 2024-02-22 RX ORDER — ATORVASTATIN CALCIUM 80 MG/1
TABLET, FILM COATED ORAL
Qty: 30 TABLET | Refills: 0 | Status: SHIPPED | OUTPATIENT
Start: 2024-02-22

## 2024-02-22 RX ORDER — GLIMEPIRIDE 2 MG/1
TABLET ORAL
Qty: 30 TABLET | Refills: 0 | Status: SHIPPED | OUTPATIENT
Start: 2024-02-22

## 2024-02-22 RX ORDER — METFORMIN HYDROCHLORIDE 500 MG/1
TABLET, EXTENDED RELEASE ORAL
Qty: 120 TABLET | Refills: 0 | Status: SHIPPED | OUTPATIENT
Start: 2024-02-22

## 2024-02-22 NOTE — TELEPHONE ENCOUNTER
Spoke with wife she states he is taking the 2mg dose of the glimepiride   Medication:   Requested Prescriptions     Pending Prescriptions Disp Refills    glimepiride (AMARYL) 2 MG tablet [Pharmacy Med Name: GLIMEPIRIDE 2 MG TABLET] 30 tablet 0     Sig: TAKE 1 TABLET BY MOUTH EVERY MORNING BEFORE BREAKFAST     Signed Prescriptions Disp Refills    lisinopril-hydroCHLOROthiazide (PRINZIDE;ZESTORETIC) 20-25 MG per tablet 30 tablet 0     Sig: TAKE 1 TABLET BY MOUTH DAILY     Authorizing Provider: ANTONY LITTLE    atorvastatin (LIPITOR) 80 MG tablet 30 tablet 0     Sig: TAKE 1 TABLET BY MOUTH DAILY     Authorizing Provider: ANTONY LITTLE    metFORMIN (GLUCOPHAGE-XR) 500 MG extended release tablet 120 tablet 0     Sig: TAKE FOUR TABLETS BY MOUTH DAILY WITH BREAKFAST     Authorizing Provider: ANTONY LITTLE        Last Filled:  11/09/2023    Patient Phone Number: [phone] (home)     Last appt: 8/9/2023   Next appt: 2/22/2024    Last OARRS:        No data to display

## 2024-02-22 NOTE — TELEPHONE ENCOUNTER
Medication:   Requested Prescriptions     Pending Prescriptions Disp Refills    ASPIRIN LOW DOSE 81 MG EC tablet [Pharmacy Med Name: ASPIRIN EC 81 MG TABLET] 90 tablet 3     Sig: TAKE 1 TABLET BY MOUTH EVERY DAY        Last Filled:  11/7/2023     Patient Phone Number: [phone] (home)     Last appt: 8/9/2023   Next appt: Visit date not found

## 2024-03-14 ENCOUNTER — OFFICE VISIT (OUTPATIENT)
Dept: INTERNAL MEDICINE CLINIC | Age: 54
End: 2024-03-14
Payer: COMMERCIAL

## 2024-03-14 VITALS
HEIGHT: 67 IN | HEART RATE: 88 BPM | RESPIRATION RATE: 18 BRPM | OXYGEN SATURATION: 99 % | DIASTOLIC BLOOD PRESSURE: 80 MMHG | SYSTOLIC BLOOD PRESSURE: 120 MMHG | BODY MASS INDEX: 35.31 KG/M2 | WEIGHT: 225 LBS

## 2024-03-14 DIAGNOSIS — Z12.5 SCREENING PSA (PROSTATE SPECIFIC ANTIGEN): ICD-10-CM

## 2024-03-14 DIAGNOSIS — E11.8 CONTROLLED TYPE 2 DIABETES MELLITUS WITH COMPLICATION, WITHOUT LONG-TERM CURRENT USE OF INSULIN (HCC): ICD-10-CM

## 2024-03-14 DIAGNOSIS — R63.5 ABNORMAL WEIGHT GAIN: ICD-10-CM

## 2024-03-14 DIAGNOSIS — I10 ESSENTIAL HYPERTENSION: ICD-10-CM

## 2024-03-14 DIAGNOSIS — I50.20 SYSTOLIC CONGESTIVE HEART FAILURE, UNSPECIFIED HF CHRONICITY (HCC): ICD-10-CM

## 2024-03-14 DIAGNOSIS — Z12.11 SCREEN FOR COLON CANCER: ICD-10-CM

## 2024-03-14 DIAGNOSIS — E66.01 SEVERE OBESITY (BMI 35.0-39.9) WITH COMORBIDITY (HCC): ICD-10-CM

## 2024-03-14 DIAGNOSIS — E78.5 HYPERLIPIDEMIA, UNSPECIFIED HYPERLIPIDEMIA TYPE: ICD-10-CM

## 2024-03-14 DIAGNOSIS — E11.8 CONTROLLED TYPE 2 DIABETES MELLITUS WITH COMPLICATION, WITHOUT LONG-TERM CURRENT USE OF INSULIN (HCC): Primary | ICD-10-CM

## 2024-03-14 LAB
CREAT UR-MCNC: 174.1 MG/DL (ref 39–259)
MICROALBUMIN UR DL<=1MG/L-MCNC: 2.5 MG/DL
MICROALBUMIN/CREAT UR: 14.4 MG/G (ref 0–30)

## 2024-03-14 PROCEDURE — 3074F SYST BP LT 130 MM HG: CPT | Performed by: INTERNAL MEDICINE

## 2024-03-14 PROCEDURE — 90471 IMMUNIZATION ADMIN: CPT | Performed by: INTERNAL MEDICINE

## 2024-03-14 PROCEDURE — 90750 HZV VACC RECOMBINANT IM: CPT | Performed by: INTERNAL MEDICINE

## 2024-03-14 PROCEDURE — 90472 IMMUNIZATION ADMIN EACH ADD: CPT | Performed by: INTERNAL MEDICINE

## 2024-03-14 PROCEDURE — 3044F HG A1C LEVEL LT 7.0%: CPT | Performed by: INTERNAL MEDICINE

## 2024-03-14 PROCEDURE — 99214 OFFICE O/P EST MOD 30 MIN: CPT | Performed by: INTERNAL MEDICINE

## 2024-03-14 PROCEDURE — 3079F DIAST BP 80-89 MM HG: CPT | Performed by: INTERNAL MEDICINE

## 2024-03-14 PROCEDURE — 90677 PCV20 VACCINE IM: CPT | Performed by: INTERNAL MEDICINE

## 2024-03-14 ASSESSMENT — PATIENT HEALTH QUESTIONNAIRE - PHQ9
2. FEELING DOWN, DEPRESSED OR HOPELESS: NOT AT ALL
1. LITTLE INTEREST OR PLEASURE IN DOING THINGS: NOT AT ALL
SUM OF ALL RESPONSES TO PHQ QUESTIONS 1-9: 0
SUM OF ALL RESPONSES TO PHQ QUESTIONS 1-9: 0
SUM OF ALL RESPONSES TO PHQ9 QUESTIONS 1 & 2: 0
SUM OF ALL RESPONSES TO PHQ QUESTIONS 1-9: 0
SUM OF ALL RESPONSES TO PHQ QUESTIONS 1-9: 0

## 2024-03-14 NOTE — PATIENT INSTRUCTIONS
Shingrix and prevnar today    Labs today  Urine test    Get your eye exam    Cologuard or colonoscopy  [id number]

## 2024-03-14 NOTE — PROGRESS NOTES
Angel Doe (:  1970) is a 54 y.o. male, here for evaluation of the following chief complaint(s):    Follow-up      ASSESSMENT/PLAN:  1. Controlled type 2 diabetes mellitus with complication, without long-term current use of insulin (HCC)  Stable on metformin and glimepiride  -     Microalbumin / Creatinine Urine Ratio  -     Diabetic Foot Exam  2. Screen for colon cancer  -     Fecal DNA Colorectal cancer screening ([facility])  3. Severe obesity (BMI 35.0-39.9) with comorbidity (HCC)  Counseled on weight loss  4. Systolic congestive heart failure, unspecified HF chronicity (HCC)  Encourage patient to get previously ordered echo  5. Abnormal weight gain  -     TSH; Future  6. Essential hypertension  Well-controlled on amlodipine and Zestoretic  7. Hyperlipidemia, unspecified hyperlipidemia type  Stable on statin  -     Lipid Panel; Future    Cerebrovascular accident (CVA), unspecified mechanism (HCC)  Stable on aspirin, statin     Tobacco use disorder -encouraged patient to fully quit- he has cut back  Return in about 6 months (around 2024).    SUBJECTIVE/OBJECTIVE:  HPI  Patient is here for routine visit.  Overall he feels well because he is working fewer hours.  He is trying to cut back on cigarettes.  His weight is up a few pounds.      Review of Systems   Respiratory:  Negative for shortness of breath.    Cardiovascular:  Negative for chest pain.       Past Medical History:   Diagnosis Date    Closed fracture of right tibia and fibula     as child    COVID-19     Diabetes mellitus type 2, uncomplicated (HCC)     H/O eye muscle disorder     right    Hypertension     Other hyperlipidemia     Stroke (HCC) 2021    uc    Systolic CHF (HCC)     echo     Tobacco use disorder        Current Outpatient Medications   Medication Sig Dispense Refill    lisinopril-hydroCHLOROthiazide (PRINZIDE;ZESTORETIC) 20-25 MG per tablet TAKE 1 TABLET BY MOUTH DAILY 30 tablet 0    atorvastatin (LIPITOR) 80 MG

## 2024-03-15 LAB
ALBUMIN SERPL-MCNC: 4.6 G/DL (ref 3.4–5)
ALBUMIN/GLOB SERPL: 1.4 {RATIO} (ref 1.1–2.2)
ALP SERPL-CCNC: 77 U/L (ref 40–129)
ALT SERPL-CCNC: 31 U/L (ref 10–40)
ANION GAP SERPL CALCULATED.3IONS-SCNC: 13 MMOL/L (ref 3–16)
AST SERPL-CCNC: 27 U/L (ref 15–37)
BILIRUB SERPL-MCNC: <0.2 MG/DL (ref 0–1)
BUN SERPL-MCNC: 11 MG/DL (ref 7–20)
CALCIUM SERPL-MCNC: 9.7 MG/DL (ref 8.3–10.6)
CHLORIDE SERPL-SCNC: 102 MMOL/L (ref 99–110)
CHOLEST SERPL-MCNC: 176 MG/DL (ref 0–199)
CO2 SERPL-SCNC: 24 MMOL/L (ref 21–32)
CREAT SERPL-MCNC: 1.1 MG/DL (ref 0.9–1.3)
EST. AVERAGE GLUCOSE BLD GHB EST-MCNC: 139.9 MG/DL
GFR SERPLBLD CREATININE-BSD FMLA CKD-EPI: >60 ML/MIN/{1.73_M2}
GLUCOSE SERPL-MCNC: 97 MG/DL (ref 70–99)
HBA1C MFR BLD: 6.5 %
HDLC SERPL-MCNC: 35 MG/DL (ref 40–60)
LDLC SERPL CALC-MCNC: 127 MG/DL
POTASSIUM SERPL-SCNC: 4.5 MMOL/L (ref 3.5–5.1)
PROT SERPL-MCNC: 7.9 G/DL (ref 6.4–8.2)
PSA SERPL DL<=0.01 NG/ML-MCNC: 0.68 NG/ML (ref 0–4)
SODIUM SERPL-SCNC: 139 MMOL/L (ref 136–145)
TRIGL SERPL-MCNC: 69 MG/DL (ref 0–150)
TSH SERPL DL<=0.005 MIU/L-ACNC: 1 UIU/ML (ref 0.27–4.2)
VLDLC SERPL CALC-MCNC: 14 MG/DL

## 2024-03-19 ASSESSMENT — ENCOUNTER SYMPTOMS: SHORTNESS OF BREATH: 0

## 2024-04-04 DIAGNOSIS — E78.5 HYPERLIPIDEMIA, UNSPECIFIED HYPERLIPIDEMIA TYPE: ICD-10-CM

## 2024-04-04 RX ORDER — ATORVASTATIN CALCIUM 80 MG/1
TABLET, FILM COATED ORAL
Qty: 30 TABLET | Refills: 0 | Status: SHIPPED | OUTPATIENT
Start: 2024-04-04

## 2024-04-08 RX ORDER — LISINOPRIL AND HYDROCHLOROTHIAZIDE 25; 20 MG/1; MG/1
TABLET ORAL
Qty: 30 TABLET | Refills: 0 | Status: SHIPPED | OUTPATIENT
Start: 2024-04-08

## 2024-04-08 RX ORDER — METFORMIN HYDROCHLORIDE 500 MG/1
TABLET, EXTENDED RELEASE ORAL
Qty: 120 TABLET | Refills: 0 | Status: SHIPPED | OUTPATIENT
Start: 2024-04-08

## 2024-04-08 RX ORDER — GLIMEPIRIDE 2 MG/1
TABLET ORAL
Qty: 30 TABLET | Refills: 0 | Status: SHIPPED | OUTPATIENT
Start: 2024-04-08

## 2024-04-08 NOTE — TELEPHONE ENCOUNTER
Last appointment: 3/14/2024  Next appointment: Visit date not found  Last refill:   02/22/2024 for all 3

## 2024-07-24 DIAGNOSIS — E78.5 HYPERLIPIDEMIA, UNSPECIFIED HYPERLIPIDEMIA TYPE: ICD-10-CM

## 2024-07-24 RX ORDER — LISINOPRIL AND HYDROCHLOROTHIAZIDE 25; 20 MG/1; MG/1
TABLET ORAL
Qty: 30 TABLET | Refills: 0 | Status: SHIPPED | OUTPATIENT
Start: 2024-07-24

## 2024-07-24 RX ORDER — ATORVASTATIN CALCIUM 80 MG/1
TABLET, FILM COATED ORAL
Qty: 30 TABLET | Refills: 0 | Status: SHIPPED | OUTPATIENT
Start: 2024-07-24

## 2024-07-24 RX ORDER — GLIMEPIRIDE 2 MG/1
TABLET ORAL
Qty: 30 TABLET | Refills: 0 | Status: SHIPPED | OUTPATIENT
Start: 2024-07-24

## 2024-07-24 NOTE — TELEPHONE ENCOUNTER
Last appointment: 3/14/2024    Return in about 6 months (around 9/14/2024).  Next appointment: Visit date not found  Last refill:   Lipitor: 04/04/2024  Amaryl and Lisinopril-HCTZ: 04/08/2024  Sent [employer] message to schedule next appointment due in September.

## 2024-08-29 NOTE — TELEPHONE ENCOUNTER
Last appointment: 3/14/2024  Return in about 6 months (around 9/14/2024).  Next appointment: Visit date not found  Last refill: 07/24/2024  Sent [employer] message to schedule next appointment due in September.      This request was for 1mg Glimepiride but it looks like patient is on 2mg in the chart. Please advise

## 2024-08-30 RX ORDER — GLIMEPIRIDE 1 MG/1
1 TABLET ORAL
Qty: 90 TABLET | Refills: 1 | Status: SHIPPED | OUTPATIENT
Start: 2024-08-30

## 2024-11-18 DIAGNOSIS — E11.9 TYPE 2 DIABETES MELLITUS WITHOUT COMPLICATION, WITHOUT LONG-TERM CURRENT USE OF INSULIN (HCC): Primary | ICD-10-CM

## 2024-11-18 DIAGNOSIS — E78.5 HYPERLIPIDEMIA, UNSPECIFIED HYPERLIPIDEMIA TYPE: ICD-10-CM

## 2024-11-18 DIAGNOSIS — I10 ESSENTIAL HYPERTENSION: ICD-10-CM

## 2024-11-18 DIAGNOSIS — I10 PRIMARY HYPERTENSION: ICD-10-CM

## 2024-11-18 DIAGNOSIS — I63.9 CEREBROVASCULAR ACCIDENT (CVA), UNSPECIFIED MECHANISM (HCC): ICD-10-CM

## 2024-11-18 RX ORDER — AMLODIPINE BESYLATE 5 MG/1
7.5 TABLET ORAL DAILY
Qty: 135 TABLET | Refills: 0 | Status: SHIPPED | OUTPATIENT
Start: 2024-11-18

## 2024-11-18 NOTE — TELEPHONE ENCOUNTER
Last appointment: 3/14/24  Next appointment: Visit date not found    Return in about 6 months (around 9/14/2024).  Last refill: 8/16/23  Requested Prescriptions     Pending Prescriptions Disp Refills    amLODIPine (NORVASC) 5 MG tablet 135 tablet 0     Sig: Take 1.5 tablets by mouth daily

## 2024-11-19 RX ORDER — ATORVASTATIN CALCIUM 80 MG/1
TABLET, FILM COATED ORAL
Qty: 90 TABLET | Refills: 0 | Status: SHIPPED | OUTPATIENT
Start: 2024-11-19

## 2024-11-19 RX ORDER — METFORMIN HYDROCHLORIDE 500 MG/1
TABLET, EXTENDED RELEASE ORAL
Qty: 360 TABLET | Refills: 0 | Status: SHIPPED | OUTPATIENT
Start: 2024-11-19

## 2024-11-19 RX ORDER — ASPIRIN 81 MG/1
81 TABLET ORAL DAILY
Qty: 90 TABLET | Refills: 3 | Status: SHIPPED | OUTPATIENT
Start: 2024-11-19

## 2024-11-19 RX ORDER — LISINOPRIL AND HYDROCHLOROTHIAZIDE 20; 25 MG/1; MG/1
TABLET ORAL
Qty: 90 TABLET | Refills: 0 | Status: SHIPPED | OUTPATIENT
Start: 2024-11-19

## 2024-11-19 RX ORDER — GLIMEPIRIDE 1 MG/1
1 TABLET ORAL
Qty: 90 TABLET | Refills: 0 | Status: SHIPPED | OUTPATIENT
Start: 2024-11-19

## 2024-11-19 NOTE — TELEPHONE ENCOUNTER
Last appointment: 3/14/2024  Next appointment: 1/15/2025  Last refill:   Asa 2/22/24  Metformin 4/29/24  Atorvastatin 7/24/24  Lisinopril / hchtz 7/24/24  Glimepiride 8/30/24   Requested Prescriptions     Pending Prescriptions Disp Refills    aspirin (ASPIRIN LOW DOSE) 81 MG EC tablet 90 tablet 3     Sig: Take 1 tablet by mouth daily    metFORMIN (GLUCOPHAGE-XR) 500 MG extended release tablet 360 tablet 0     Sig: TAKE FOUR TABLETS BY MOUTH DAILY WITH BREAKFAST    atorvastatin (LIPITOR) 80 MG tablet 30 tablet 0     Sig: TAKE 1 TABLET BY MOUTH EVERY DAY    lisinopril-hydroCHLOROthiazide (PRINZIDE;ZESTORETIC) 20-25 MG per tablet 30 tablet 0     Sig: TAKE 1 TABLET BY MOUTH EVERY DAY    glimepiride (AMARYL) 1 MG tablet 90 tablet 1     Sig: Take 1 tablet by mouth every morning (before breakfast)

## 2025-02-14 DIAGNOSIS — I10 ESSENTIAL HYPERTENSION: ICD-10-CM

## 2025-02-14 RX ORDER — AMLODIPINE BESYLATE 5 MG/1
TABLET ORAL
Qty: 45 TABLET | Refills: 0 | Status: SHIPPED | OUTPATIENT
Start: 2025-02-14

## 2025-02-14 NOTE — TELEPHONE ENCOUNTER
Last appointment: 3/14/2024  Next appointment: Visit date not found        Last refill: 11/18/24

## 2025-02-15 DIAGNOSIS — E78.5 HYPERLIPIDEMIA, UNSPECIFIED HYPERLIPIDEMIA TYPE: ICD-10-CM

## 2025-02-15 DIAGNOSIS — I10 PRIMARY HYPERTENSION: ICD-10-CM

## 2025-02-17 NOTE — TELEPHONE ENCOUNTER
Last appointment: 3/14/2024  Next appointment: Visit date not found  Return in about 6 months (around 9/14/2024).      Sent [employer] message to schedule due/overdue appointment.     Last refill: 11/19/24

## 2025-02-27 NOTE — TELEPHONE ENCOUNTER
Spoke to Spouse in regards to scheduling an appt and stated patient will call back to schedule. Did inform her if no appt scheduled it may interfere with future refills.

## 2025-03-28 RX ORDER — LISINOPRIL AND HYDROCHLOROTHIAZIDE 20; 25 MG/1; MG/1
TABLET ORAL
Qty: 90 TABLET | Refills: 0 | OUTPATIENT
Start: 2025-03-28

## 2025-03-28 RX ORDER — ATORVASTATIN CALCIUM 80 MG/1
TABLET, FILM COATED ORAL
Qty: 90 TABLET | Refills: 0 | OUTPATIENT
Start: 2025-03-28

## 2025-03-28 NOTE — TELEPHONE ENCOUNTER
Have made multiple attempts to contact patient and letter has been sent.Please un pend or accept refill order to close out chart

## 2025-04-13 DIAGNOSIS — E11.9 TYPE 2 DIABETES MELLITUS WITHOUT COMPLICATION, WITHOUT LONG-TERM CURRENT USE OF INSULIN (HCC): ICD-10-CM

## 2025-04-14 RX ORDER — METFORMIN HYDROCHLORIDE 500 MG/1
TABLET, EXTENDED RELEASE ORAL
Qty: 360 TABLET | Refills: 1 | OUTPATIENT
Start: 2025-04-14

## 2025-04-14 NOTE — TELEPHONE ENCOUNTER
Last visit was a year ago.  Labs are not up-to-date.  No additional refills without appointment.  Please advise patient to establish with new PCP

## 2025-04-16 ENCOUNTER — OFFICE VISIT (OUTPATIENT)
Dept: INTERNAL MEDICINE CLINIC | Age: 55
End: 2025-04-16

## 2025-04-16 VITALS
HEART RATE: 94 BPM | DIASTOLIC BLOOD PRESSURE: 82 MMHG | BODY MASS INDEX: 34.13 KG/M2 | OXYGEN SATURATION: 97 % | HEIGHT: 68 IN | SYSTOLIC BLOOD PRESSURE: 124 MMHG | WEIGHT: 225.2 LBS

## 2025-04-16 DIAGNOSIS — I10 PRIMARY HYPERTENSION: ICD-10-CM

## 2025-04-16 DIAGNOSIS — Z12.11 SCREEN FOR COLON CANCER: ICD-10-CM

## 2025-04-16 DIAGNOSIS — I50.20 SYSTOLIC CONGESTIVE HEART FAILURE, UNSPECIFIED HF CHRONICITY (HCC): ICD-10-CM

## 2025-04-16 DIAGNOSIS — E78.5 HYPERLIPIDEMIA, UNSPECIFIED HYPERLIPIDEMIA TYPE: ICD-10-CM

## 2025-04-16 DIAGNOSIS — Z00.00 ROUTINE ADULT HEALTH MAINTENANCE: Primary | ICD-10-CM

## 2025-04-16 DIAGNOSIS — E11.9 TYPE 2 DIABETES MELLITUS WITHOUT COMPLICATION, WITHOUT LONG-TERM CURRENT USE OF INSULIN (HCC): ICD-10-CM

## 2025-04-16 DIAGNOSIS — Z12.5 SCREENING PSA (PROSTATE SPECIFIC ANTIGEN): ICD-10-CM

## 2025-04-16 DIAGNOSIS — I10 ESSENTIAL HYPERTENSION: ICD-10-CM

## 2025-04-16 DIAGNOSIS — R06.09 OTHER FORMS OF DYSPNEA: ICD-10-CM

## 2025-04-16 PROBLEM — E66.01 MORBID (SEVERE) OBESITY DUE TO EXCESS CALORIES (HCC): Status: ACTIVE | Noted: 2025-04-16

## 2025-04-16 RX ORDER — LISINOPRIL AND HYDROCHLOROTHIAZIDE 20; 25 MG/1; MG/1
TABLET ORAL
Qty: 90 TABLET | Refills: 0 | Status: SHIPPED | OUTPATIENT
Start: 2025-04-16

## 2025-04-16 RX ORDER — GLIMEPIRIDE 1 MG/1
1 TABLET ORAL
Qty: 90 TABLET | Refills: 0 | Status: SHIPPED | OUTPATIENT
Start: 2025-04-16

## 2025-04-16 RX ORDER — AMLODIPINE BESYLATE 5 MG/1
7.5 TABLET ORAL DAILY
Qty: 135 TABLET | Refills: 0 | Status: SHIPPED | OUTPATIENT
Start: 2025-04-16 | End: 2025-07-15

## 2025-04-16 RX ORDER — ATORVASTATIN CALCIUM 80 MG/1
TABLET, FILM COATED ORAL
Qty: 90 TABLET | Refills: 0 | Status: SHIPPED | OUTPATIENT
Start: 2025-04-16

## 2025-04-16 RX ORDER — METFORMIN HYDROCHLORIDE 500 MG/1
TABLET, EXTENDED RELEASE ORAL
Qty: 360 TABLET | Refills: 0 | Status: SHIPPED | OUTPATIENT
Start: 2025-04-16

## 2025-04-16 SDOH — ECONOMIC STABILITY: FOOD INSECURITY: WITHIN THE PAST 12 MONTHS, THE FOOD YOU BOUGHT JUST DIDN'T LAST AND YOU DIDN'T HAVE MONEY TO GET MORE.: NEVER TRUE

## 2025-04-16 SDOH — ECONOMIC STABILITY: FOOD INSECURITY: WITHIN THE PAST 12 MONTHS, YOU WORRIED THAT YOUR FOOD WOULD RUN OUT BEFORE YOU GOT MONEY TO BUY MORE.: NEVER TRUE

## 2025-04-16 ASSESSMENT — PATIENT HEALTH QUESTIONNAIRE - PHQ9
1. LITTLE INTEREST OR PLEASURE IN DOING THINGS: NOT AT ALL
SUM OF ALL RESPONSES TO PHQ QUESTIONS 1-9: 0
2. FEELING DOWN, DEPRESSED OR HOPELESS: NOT AT ALL
SUM OF ALL RESPONSES TO PHQ QUESTIONS 1-9: 0

## 2025-04-16 NOTE — PROGRESS NOTES
Subjective   History:    Patient is here for physical exam.  He reports compliance with medication.  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  He is trying to slow down on his smoking.    Review of Systems    No Known Allergies  Prior to Visit Medications    Medication Sig Taking? Authorizing Provider   amLODIPine (NORVASC) 5 MG tablet Take 1.5 tablets by mouth daily Yes Ariana Vega MD   atorvastatin (LIPITOR) 80 MG tablet TAKE 1 TABLET BY MOUTH EVERY DAY Yes Ariana Vega MD   glimepiride (AMARYL) 1 MG tablet Take 1 tablet by mouth every morning (before breakfast) Yes Ariana Vega MD   lisinopril-hydroCHLOROthiazide (PRINZIDE;ZESTORETIC) 20-25 MG per tablet TAKE 1 TABLET BY MOUTH EVERY DAY Yes Ariana Vega MD   metFORMIN (GLUCOPHAGE-XR) 500 MG extended release tablet TAKE FOUR TABLETS BY MOUTH DAILY WITH BREAKFAST Yes Ariana Vega MD   aspirin (ASPIRIN LOW DOSE) 81 MG EC tablet Take 1 tablet by mouth daily Yes Ariana Vega MD     Past Medical History:   Diagnosis Date    Closed fracture of right tibia and fibula     as child    COVID-19     Diabetes mellitus type 2, uncomplicated (HCC)     H/O eye muscle disorder     right    Hypertension     Other hyperlipidemia     Stroke (HCC) 09/2021    uc    Systolic CHF (HCC)     echo 9/21    Tobacco use disorder      History reviewed. No pertinent surgical history.  Family History   Problem Relation Age of Onset    Cancer Mother         leukemia    High Blood Pressure Father      Social History     Tobacco Use    Smoking status: Every Day     Current packs/day: 0.25     Average packs/day: 0.3 packs/day for 25.3 years (6.3 ttl pk-yrs)     Types: Cigarettes     Start date: 1/1/2000    Smokeless tobacco: Current     Types: Chew   Substance Use Topics    Alcohol use: Yes    Drug use: Not Currently           Objective    Vital Signs  /82   Pulse 94   Ht 1.715 m (5' 7.5\")   Wt

## 2025-04-17 LAB
CREAT UR-MCNC: 235 MG/DL (ref 39–259)
MICROALBUMIN UR DL<=1MG/L-MCNC: 4.77 MG/DL
MICROALBUMIN/CREAT UR: 20.3 MG/G (ref 0–30)

## 2025-04-18 ENCOUNTER — RESULTS FOLLOW-UP (OUTPATIENT)
Dept: INTERNAL MEDICINE CLINIC | Age: 55
End: 2025-04-18

## 2025-04-18 RX ORDER — GLIMEPIRIDE 2 MG/1
2 TABLET ORAL
Qty: 30 TABLET | Refills: 0 | OUTPATIENT
Start: 2025-04-18

## 2025-04-28 ENCOUNTER — TELEPHONE (OUTPATIENT)
Dept: INTERNAL MEDICINE CLINIC | Age: 55
End: 2025-04-28

## 2025-04-28 DIAGNOSIS — E78.5 HYPERLIPIDEMIA, UNSPECIFIED HYPERLIPIDEMIA TYPE: ICD-10-CM

## 2025-04-28 DIAGNOSIS — E11.9 TYPE 2 DIABETES MELLITUS WITHOUT COMPLICATION, WITHOUT LONG-TERM CURRENT USE OF INSULIN (HCC): ICD-10-CM

## 2025-04-28 NOTE — TELEPHONE ENCOUNTER
Patient's spouse is callingin regards to medication refill that was supposed to have gone to [facility] not [facility].    Please resend medication to :  [facility]/pharmacy #[id number] - [place], [place] - [address] - P [phone] - F [phone]     amLODIPine (NORVASC) 5 MG tablet     atorvastatin (LIPITOR) 80 MG tablet     glimepiride (AMARYL) 1 MG tablet     isinopril-hydroCHLOROthiazide (PRINZIDE;ZESTORETIC) 20-25 MG per tablet     metFORMIN (GLUCOPHAGE-XR) 500 MG extended release tablet

## 2025-04-28 NOTE — TELEPHONE ENCOUNTER
----- Message from Dr. Ariana Vega MD sent at 4/26/2025 12:54 PM EDT -----  Remind patient to get labs and to establish with new PCP

## 2025-04-28 NOTE — TELEPHONE ENCOUNTER
Please find out from patient if he plans to get the blood work that was ordered on April 16.  It has been over a year since he has had labs.  Then send back to me.

## 2025-05-01 NOTE — TELEPHONE ENCOUNTER
Called and spoke to wife in regards to labs and informed her that per  she will not release the medication's pended until labs are done.    Wife understood and will bring patient to get labs done.

## 2025-05-02 DIAGNOSIS — E78.5 HYPERLIPIDEMIA, UNSPECIFIED HYPERLIPIDEMIA TYPE: ICD-10-CM

## 2025-05-02 DIAGNOSIS — Z12.5 SCREENING PSA (PROSTATE SPECIFIC ANTIGEN): ICD-10-CM

## 2025-05-02 DIAGNOSIS — I10 PRIMARY HYPERTENSION: ICD-10-CM

## 2025-05-02 DIAGNOSIS — E11.9 TYPE 2 DIABETES MELLITUS WITHOUT COMPLICATION, WITHOUT LONG-TERM CURRENT USE OF INSULIN (HCC): ICD-10-CM

## 2025-05-02 LAB
ALBUMIN SERPL-MCNC: 4.3 G/DL (ref 3.4–5)
ALBUMIN/GLOB SERPL: 1.4 {RATIO} (ref 1.1–2.2)
ALP SERPL-CCNC: 94 U/L (ref 40–129)
ALT SERPL-CCNC: 46 U/L (ref 10–40)
ANION GAP SERPL CALCULATED.3IONS-SCNC: 12 MMOL/L (ref 3–16)
AST SERPL-CCNC: 30 U/L (ref 15–37)
BILIRUB SERPL-MCNC: <0.2 MG/DL (ref 0–1)
BUN SERPL-MCNC: 13 MG/DL (ref 7–20)
CALCIUM SERPL-MCNC: 9.7 MG/DL (ref 8.3–10.6)
CHLORIDE SERPL-SCNC: 100 MMOL/L (ref 99–110)
CHOLEST SERPL-MCNC: 212 MG/DL (ref 0–199)
CO2 SERPL-SCNC: 22 MMOL/L (ref 21–32)
CREAT SERPL-MCNC: 1 MG/DL (ref 0.9–1.3)
EST. AVERAGE GLUCOSE BLD GHB EST-MCNC: 197.3 MG/DL
GFR SERPLBLD CREATININE-BSD FMLA CKD-EPI: 90 ML/MIN/{1.73_M2}
GLUCOSE SERPL-MCNC: 257 MG/DL (ref 70–99)
HBA1C MFR BLD: 8.5 %
HDLC SERPL-MCNC: 37 MG/DL (ref 40–60)
LDLC SERPL CALC-MCNC: 134 MG/DL
POTASSIUM SERPL-SCNC: 4.6 MMOL/L (ref 3.5–5.1)
PROT SERPL-MCNC: 7.3 G/DL (ref 6.4–8.2)
PSA SERPL DL<=0.01 NG/ML-MCNC: 0.6 NG/ML (ref 0–4)
SODIUM SERPL-SCNC: 134 MMOL/L (ref 136–145)
TRIGL SERPL-MCNC: 207 MG/DL (ref 0–150)
VIT B12 SERPL-MCNC: 876 PG/ML (ref 211–911)
VLDLC SERPL CALC-MCNC: 41 MG/DL

## 2025-05-02 RX ORDER — AMLODIPINE BESYLATE 5 MG/1
7.5 TABLET ORAL DAILY
Qty: 135 TABLET | Refills: 1 | Status: SHIPPED | OUTPATIENT
Start: 2025-05-02 | End: 2025-07-31

## 2025-05-02 RX ORDER — GLIMEPIRIDE 1 MG/1
1 TABLET ORAL
Qty: 90 TABLET | Refills: 1 | Status: SHIPPED | OUTPATIENT
Start: 2025-05-02

## 2025-05-02 RX ORDER — ATORVASTATIN CALCIUM 80 MG/1
TABLET, FILM COATED ORAL
Qty: 90 TABLET | Refills: 1 | Status: SHIPPED | OUTPATIENT
Start: 2025-05-02

## 2025-05-02 RX ORDER — METFORMIN HYDROCHLORIDE 500 MG/1
TABLET, EXTENDED RELEASE ORAL
Qty: 360 TABLET | Refills: 1 | Status: SHIPPED | OUTPATIENT
Start: 2025-05-02

## 2025-05-02 NOTE — TELEPHONE ENCOUNTER
The patient spouse Sunni called back into the office today and stated that the patient did get his labs today and would like to see if Dr. Vega can go ahead and send over the patients refills to the pharmacy. Please advise.